# Patient Record
Sex: FEMALE | Race: WHITE | NOT HISPANIC OR LATINO | URBAN - METROPOLITAN AREA
[De-identification: names, ages, dates, MRNs, and addresses within clinical notes are randomized per-mention and may not be internally consistent; named-entity substitution may affect disease eponyms.]

---

## 2017-03-29 ENCOUNTER — APPOINTMENT (RX ONLY)
Dept: URBAN - METROPOLITAN AREA CLINIC 349 | Facility: CLINIC | Age: 32
Setting detail: DERMATOLOGY
End: 2017-03-29

## 2017-03-29 DIAGNOSIS — D22 MELANOCYTIC NEVI: ICD-10-CM

## 2017-03-29 DIAGNOSIS — L82.0 INFLAMED SEBORRHEIC KERATOSIS: ICD-10-CM

## 2017-03-29 DIAGNOSIS — Z71.89 OTHER SPECIFIED COUNSELING: ICD-10-CM

## 2017-03-29 PROBLEM — D22.5 MELANOCYTIC NEVI OF TRUNK: Status: ACTIVE | Noted: 2017-03-29

## 2017-03-29 PROBLEM — J30.1 ALLERGIC RHINITIS DUE TO POLLEN: Status: ACTIVE | Noted: 2017-03-29

## 2017-03-29 PROCEDURE — ? COUNSELING

## 2017-03-29 PROCEDURE — ? SHAVE REMOVAL

## 2017-03-29 PROCEDURE — 99213 OFFICE O/P EST LOW 20 MIN: CPT | Mod: 25

## 2017-03-29 PROCEDURE — 11301 SHAVE SKIN LESION 0.6-1.0 CM: CPT

## 2017-03-29 PROCEDURE — 88305 TISSUE EXAM BY PATHOLOGIST: CPT

## 2017-03-29 PROCEDURE — A4550 SURGICAL TRAYS: HCPCS

## 2017-03-29 PROCEDURE — ? PATHOLOGY BILLING

## 2017-03-29 ASSESSMENT — LOCATION DETAILED DESCRIPTION DERM
LOCATION DETAILED: LEFT SUPERIOR MEDIAL MIDBACK
LOCATION DETAILED: SUPERIOR LUMBAR SPINE
LOCATION DETAILED: INFERIOR THORACIC SPINE
LOCATION DETAILED: SUPERIOR THORACIC SPINE
LOCATION DETAILED: LEFT INFERIOR UPPER BACK
LOCATION DETAILED: RIGHT MEDIAL SUPERIOR CHEST

## 2017-03-29 ASSESSMENT — LOCATION SIMPLE DESCRIPTION DERM
LOCATION SIMPLE: LEFT UPPER BACK
LOCATION SIMPLE: CHEST
LOCATION SIMPLE: LEFT LOWER BACK
LOCATION SIMPLE: LOWER BACK
LOCATION SIMPLE: UPPER BACK

## 2017-03-29 ASSESSMENT — LOCATION ZONE DERM: LOCATION ZONE: TRUNK

## 2017-03-29 NOTE — PROCEDURE: SHAVE REMOVAL
Post-Care Instructions: I reviewed with the patient in detail post-care instructions. Patient is to keep the biopsy site dry overnight, and then apply vaseline twice daily until healed. Patient may apply hydrogen peroxide soaks to remove any crusting. After the procedure, the patient was observed for 5-10 minutes and was oriented to person, place and time and demied feeling dizzy, queasy, and stated that they did not feel that they were going to faint.
Size Of Lesion In Cm (Required): 0.6
Medical Necessity Clause: This procedure was medically necessary because the lesion that was treated was: growing
Billing Type: Third-Party Bill
Notification Instructions: Patient will be notified of biopsy results. However, patient instructed to call the office if not contacted within 2 weeks.
Size Of Margin In Cm (Margins Are Not Added To Billing Dimensions): -
Render Post-Care Instructions In Note?: yes
Path Notes (To The Dermatopathologist): Please check margins.
Bill 81353 For Specimen Handling/Conveyance To Laboratory?: no
Wound Care: Vaseline
Detail Level: Detailed
Biopsy Method: Dermablade
Anesthesia Type: 1% lidocaine with epinephrine
Accession #: Dr Hodge read
Consent was obtained from the patient. The risks and benefits to therapy were discussed in detail. Specifically, the risks of infection, scarring, bleeding, prolonged wound healing, incomplete removal, allergy to anesthesia, nerve injury and recurrence were addressed. Prior to the procedure, the treatment site was clearly identified and confirmed by the patient. All components of Universal Protocol/PAUSE Rule completed.
Anesthesia Volume In Cc: 0.5
Hemostasis: Drysol
Medical Necessity Information: It is in your best interest to select a reason for this procedure from the list below. All of these items fulfill various CMS LCD requirements except the new and changing color options.
X Size Of Lesion In Cm (Optional): 0

## 2018-08-17 ENCOUNTER — HOSPITAL ENCOUNTER (OUTPATIENT)
Dept: ULTRASOUND IMAGING | Age: 33
Discharge: HOME | End: 2018-08-17
Attending: OBSTETRICS & GYNECOLOGY
Payer: COMMERCIAL

## 2018-08-17 DIAGNOSIS — M79.89 RIGHT LEG SWELLING: ICD-10-CM

## 2018-08-17 PROCEDURE — 93971 EXTREMITY STUDY: CPT

## 2018-09-02 ENCOUNTER — HOSPITAL ENCOUNTER (INPATIENT)
Age: 33
LOS: 3 days | Discharge: HOME | End: 2018-09-05
Attending: OBSTETRICS & GYNECOLOGY | Admitting: OBSTETRICS & GYNECOLOGY
Payer: COMMERCIAL

## 2018-09-02 PROBLEM — Z37.9 NORMAL LABOR: Status: ACTIVE | Noted: 2018-09-02

## 2018-09-02 LAB
ABO + RH BLD: NORMAL
BLOOD GROUP ANTIBODIES SERPL: NORMAL
ERYTHROCYTE [DISTWIDTH] IN BLOOD BY AUTOMATED COUNT: 13.2 %
HCT VFR BLD AUTO: 37.8 % (ref 35.8–46.3)
HGB BLD-MCNC: 12.6 G/DL (ref 11.7–15.4)
MCH RBC QN AUTO: 31 PG (ref 26.1–32.9)
MCHC RBC AUTO-ENTMCNC: 33.3 G/DL (ref 31.4–35)
MCV RBC AUTO: 92.9 FL (ref 79.6–97.8)
NRBC # BLD: 0 K/UL (ref 0–0.2)
PLATELET # BLD AUTO: 183 K/UL (ref 150–450)
PMV BLD AUTO: 12.1 FL (ref 9.4–12.3)
RBC # BLD AUTO: 4.07 M/UL (ref 4.05–5.2)
SPECIMEN EXP DATE BLD: NORMAL
WBC # BLD AUTO: 9.9 K/UL (ref 4.3–11.1)

## 2018-09-02 PROCEDURE — 65270000029 HC RM PRIVATE

## 2018-09-02 PROCEDURE — 36415 COLL VENOUS BLD VENIPUNCTURE: CPT

## 2018-09-02 PROCEDURE — 74011250637 HC RX REV CODE- 250/637: Performed by: OBSTETRICS & GYNECOLOGY

## 2018-09-02 PROCEDURE — 85027 COMPLETE CBC AUTOMATED: CPT

## 2018-09-02 PROCEDURE — 86901 BLOOD TYPING SEROLOGIC RH(D): CPT

## 2018-09-02 RX ORDER — SODIUM CHLORIDE 0.9 % (FLUSH) 0.9 %
5-10 SYRINGE (ML) INJECTION EVERY 8 HOURS
Status: DISCONTINUED | OUTPATIENT
Start: 2018-09-02 | End: 2018-09-04

## 2018-09-02 RX ORDER — LIDOCAINE HYDROCHLORIDE 10 MG/ML
1 INJECTION INFILTRATION; PERINEURAL
Status: DISPENSED | OUTPATIENT
Start: 2018-09-02 | End: 2018-09-03

## 2018-09-02 RX ORDER — DEXTROSE, SODIUM CHLORIDE, SODIUM LACTATE, POTASSIUM CHLORIDE, AND CALCIUM CHLORIDE 5; .6; .31; .03; .02 G/100ML; G/100ML; G/100ML; G/100ML; G/100ML
125 INJECTION, SOLUTION INTRAVENOUS CONTINUOUS
Status: DISCONTINUED | OUTPATIENT
Start: 2018-09-02 | End: 2018-09-05 | Stop reason: HOSPADM

## 2018-09-02 RX ORDER — OXYTOCIN/RINGER'S LACTATE 15/250 ML
250 PLASTIC BAG, INJECTION (ML) INTRAVENOUS ONCE
Status: DISPENSED | OUTPATIENT
Start: 2018-09-02 | End: 2018-09-03

## 2018-09-02 RX ORDER — BUTORPHANOL TARTRATE 1 MG/ML
1 INJECTION INTRAMUSCULAR; INTRAVENOUS
Status: DISCONTINUED | OUTPATIENT
Start: 2018-09-02 | End: 2018-09-03 | Stop reason: HOSPADM

## 2018-09-02 RX ORDER — SODIUM CHLORIDE 0.9 % (FLUSH) 0.9 %
5-10 SYRINGE (ML) INJECTION AS NEEDED
Status: DISCONTINUED | OUTPATIENT
Start: 2018-09-02 | End: 2018-09-04

## 2018-09-02 RX ORDER — LANOLIN ALCOHOL/MO/W.PET/CERES
325 CREAM (GRAM) TOPICAL
COMMUNITY
End: 2021-09-02 | Stop reason: ALTCHOICE

## 2018-09-02 RX ORDER — MISOPROSTOL 100 UG/1
25 TABLET ORAL EVERY 4 HOURS
Status: DISCONTINUED | OUTPATIENT
Start: 2018-09-02 | End: 2018-09-03

## 2018-09-02 RX ORDER — LIDOCAINE HYDROCHLORIDE 20 MG/ML
JELLY TOPICAL
Status: DISPENSED | OUTPATIENT
Start: 2018-09-02 | End: 2018-09-03

## 2018-09-02 RX ORDER — MINERAL OIL
120 OIL (ML) ORAL
Status: COMPLETED | OUTPATIENT
Start: 2018-09-02 | End: 2018-09-03

## 2018-09-02 RX ADMIN — MISOPROSTOL 25 MCG: 100 TABLET ORAL at 23:46

## 2018-09-02 RX ADMIN — MISOPROSTOL 25 MCG: 100 TABLET ORAL at 19:26

## 2018-09-02 NOTE — IP AVS SNAPSHOT
[facility] 
 
 
 [id number]  [address] 
[phone] Patient: [address] MRN: [id number] :1985 About your hospitalization You were admitted on:  2018 You last received care in the:  [address] [facility] You were discharged on:  2018 Why you were hospitalized Your primary diagnosis was:  Gestational Diabetes Your diagnoses also included:  Normal Labor, Htn (Hypertension) Follow-up Information Follow up With Details Comments Contact Info Dnana Kirkpatrick MD   [address] [place] [address] [address] [facility] [address] 
[phone] Enmanuel Good MD In 6 weeks Call and schedule an appointment for a 6 week follow up with [facility] [address] [facility] [address] 
[phone] Discharge Orders None A check eladio indicates which time of day the medication should be taken. My Medications START taking these medications Instructions Each Dose to Equal  
 Morning Noon Evening Bedtime  
 docusate sodium 100 mg capsule Commonly known as:  Merle Dilling Your last dose was: Your next dose is: Take 1 Cap by mouth two (2) times daily as needed for Constipation for up to 90 days. 100 mg  
    
   
   
   
  
 ibuprofen 600 mg tablet Commonly known as:  MOTRIN Your last dose was: Your next dose is: Take 1 Tab by mouth every six (6) hours as needed. 600 mg  
    
   
   
   
  
 oxyCODONE-acetaminophen 5-325 mg per tablet Commonly known as:  PERCOCET Your last dose was: Your next dose is: Take 1 Tab by mouth every six (6) hours as needed. Max Daily Amount: 4 Tabs. 1 Tab CONTINUE taking these medications Instructions Each Dose to Equal  
 Morning Noon Evening Bedtime Iron 325 mg (65 mg iron) tablet Generic drug:  ferrous sulfate Your last dose was: Your next dose is: Take 325 mg by mouth Daily (before breakfast). 325 mg  
    
   
   
   
  
 RIGHT STEP PRENATAL VITAMINS 27 mg iron- 0.8 mg Tab tablet Generic drug:  prenatal vit-iron fumarate-fa Your last dose was: Your next dose is: Take 1 Tab by mouth daily. 1 Tab STOP taking these medications   
 norethindrone-e.estradiol-iron 1 mg-20 mcg(24) /75 mg (4) Chew Where to Get Your Medications Information on where to get these meds will be given to you by the nurse or doctor. ! Ask your nurse or doctor about these medications  
  docusate sodium 100 mg capsule  
 ibuprofen 600 mg tablet  
 oxyCODONE-acetaminophen 5-325 mg per tablet Opioid Education Prescription Opioids: What You Need to Know: 
 
Prescription opioids can be used to help relieve moderate-to-severe pain and are often prescribed following a surgery or injury, or for certain health conditions. These medications can be an important part of treatment but also come with serious risks. Opioids are strong pain medicines. Examples include hydrocodone, oxycodone, fentanyl, and morphine. Heroin is an example of an illegal opioid. It is important to work with your health care provider to make sure you are getting the safest, most effective care. WHAT ARE THE RISKS AND SIDE EFFECTS OF OPIOID USE? Prescription opioids carry serious risks of addiction and overdose, especially with prolonged use. An opioid overdose, often marked by slow breathing, can cause sudden death. The use of prescription opioids can have a number of side effects as well, even when taken as directed. · Tolerance-meaning you might need to take more of a medication for the same pain relief · Physical dependence-meaning you have symptoms of withdrawal when the medication is stopped. Withdrawal symptoms can include nausea, sweating, chills, diarrhea, stomach cramps, and muscle aches. Withdrawal can last up to several weeks, depending on which drug you took and how long you took it. · Increased sensitivity to pain · Constipation · Nausea, vomiting, and dry mouth · Sleepiness and dizziness · Confusion · Depression · Low levels of testosterone that can result in lower sex drive, energy, and strength · Itching and sweating RISKS ARE GREATER WITH:      
· History of drug misuse, substance use disorder, or overdose · Mental health conditions (such as depression or anxiety) · Sleep apnea · Older age (72 years or older) · Pregnancy Avoid alcohol while taking prescription opioids. Also, unless specifically advised by your health care provider, medications to avoid include: · Benzodiazepines (such as Xanax or Valium) · Muscle relaxants (such as Soma or Flexeril) · Hypnotics (such as Ambien or Lunesta) · Other prescription opioids KNOW YOUR OPTIONS Talk to your health care provider about ways to manage your pain that don't involve prescription opioids. Some of these options may actually work better and have fewer risks and side effects. Options may include: 
· Pain relievers such as acetaminophen, ibuprofen, and naproxen · Some medications that are also used for depression or seizures · Physical therapy and exercise · Counseling to help patients learn how to cope better with triggers of pain and stress. · Application of heat or cold compress · Massage therapy · Relaxation techniques Be Informed Make sure you know the name of your medication, how much and how often to take it, and its potential risks & side effects. IF YOU ARE PRESCRIBED OPIOIDS FOR PAIN: 
· Never take opioids in greater amounts or more often than prescribed. Remember the goal is not to be pain-free but to manage your pain at a tolerable level. · Follow up with your primary care provider to: · Work together to create a plan on how to manage your pain. · Talk about ways to help manage your pain that don't involve prescription opioids. · Talk about any and all concerns and side effects. · Help prevent misuse and abuse. · Never sell or share prescription opioids · Help prevent misuse and abuse. · Store prescription opioids in a secure place and out of reach of others (this may include visitors, children, friends, and family). · Safely dispose of unused/unwanted prescription opioids: Find your community drug take-back program or your pharmacy mail-back program, or flush them down the toilet, following guidance from the Food and Drug Administration (www.fda.gov/Drugs/ResourcesForYou). · Visit www.cdc.gov/drugoverdose to learn about the risks of opioid abuse and overdose. · If you believe you may be struggling with addiction, tell your health care provider and ask for guidance or call [employer] at 0-887-655-HUKR. Discharge Instructions Vaginal Childbirth: Care Instructions Your Care Instructions Your body will slowly heal in the next few weeks. It is easy to get too tired and overwhelmed during the first weeks after your baby is born. Changes in your hormones can shift your mood without warning. You may find it hard to meet the extra demands on your energy and time. Take it easy on yourself. Follow-up care is a key part of your treatment and safety. Be sure to make and go to all appointments, and call your doctor if you are having problems. It's also a good idea to know your test results and keep a list of the medicines you take. How can you care for yourself at home? · Vaginal bleeding and cramps ¨ After delivery, you will have a bloody discharge from the vagina. This will turn pink within a week and then white or yellow after about 10 days. It may last for 2 to 4 weeks or longer, until the uterus has healed. Use pads instead of tampons until you stop bleeding. ¨ Do not worry if you pass some blood clots, as long as they are smaller than a golf ball. If you have a tear or stitches in your vaginal area, change the pad at least every 4 hours to prevent soreness and infection. ¨ You may have cramps for the first few days after childbirth. These are normal and occur as the uterus shrinks to normal size. Take an over-the-counter pain medicine, such as acetaminophen (Tylenol), ibuprofen (Advil, Motrin), or naproxen (Aleve), for cramps. Read and follow all instructions on the label. Do not take aspirin, because it can cause more bleeding. ¨ Do not take two or more pain medicines at the same time unless the doctor told you to. Many pain medicines have acetaminophen, which is Tylenol. Too much acetaminophen (Tylenol) can be harmful. · Stitches ¨ If you have stitches, they will dissolve on their own and do not need to be removed. Follow your doctor's instructions for cleaning the stitched area. ¨ Put ice or a cold pack on your painful area for 10 to 20 minutes at a time, several times a day, for the first few days. Put a thin cloth between the ice and your skin. ¨ Sit in a few inches of warm water (sitz bath) 3 times a day and after bowel movements. The warm water helps with pain and itching. If you do not have a tub, a warm shower might help. · Breast fullness ¨ Your breasts may overfill (engorge) in the first few days after delivery. To help milk flow and to relieve pain, warm your breasts in the shower or by using warm, moist towels before nursing. ¨ If you are not nursing, do not put warmth on your breasts or touch your breasts. Wear a tight bra or sports bra and use ice until the fullness goes away. This usually takes 2 to 3 days.  
¨ Put ice or a cold pack on your breast after nursing to reduce swelling and pain. Put a thin cloth between the ice and your skin. · Activity ¨ Eat a balanced diet. Do not try to lose weight by cutting calories. Keep taking your prenatal vitamins, or take a multivitamin. ¨ Get as much rest as you can. Try to take naps when your baby sleeps during the day. ¨ Get some exercise every day. But do not do any heavy exercise until your doctor says it is okay. ¨ Wait until you are healed (about 4 to 6 weeks) before you have sexual intercourse. Your doctor will tell you when it is okay to have sex. ¨ Talk to your doctor about birth control. You can get pregnant even before your period returns. Also, you can get pregnant while you are breastfeeding. · Mental health ¨ It is normal to have some sadness, anxiety, sleeplessness, and mood swings after you go home. If you feel upset or hopeless for more than a few days or are having trouble doing the things you need to do, talk to your doctor. · Constipation and hemorrhoids ¨ Drink plenty of fluids, enough so that your urine is light yellow or clear like water. If you have kidney, heart, or liver disease and have to limit fluids, talk with your doctor before you increase the amount of fluids you drink. ¨ Eat plenty of fiber each day. Have a bran muffin or bran cereal for breakfast, and try eating a piece of fruit for a mid-afternoon snack. ¨ For painful, itchy hemorrhoids, put ice or a cold pack on the area several times a day for 10 minutes at a time. Follow this by putting a warm compress on the area for another 10 to 20 minutes or by sitting in a shallow, warm bath. When should you call for help? Call 911 anytime you think you may need emergency care. For example, call if: 
  · You passed out (lost consciousness).  
 Call your doctor now or seek immediate medical care if: 
  · You have severe vaginal bleeding.  
  · You are dizzy or lightheaded, or you feel like you may faint.  
  · You have a fever.   · You have new or more pain in your belly or pelvis.  
 Watch closely for changes in your health, and be sure to contact your doctor if: 
  · Your vaginal bleeding seems to be getting heavier.  
  · You have new or worse vaginal discharge.  
  · You feel sad, anxious, or hopeless for more than a few days.  
  · You do not get better as expected. Where can you learn more? Go to http://nikolas-izabela.info/. Enter L012 in the search box to learn more about \"Vaginal Childbirth: Care Instructions. \" Current as of: November 21, 2017 Content Version: 11.7 © 0879-3465 [employer]. Care instructions adapted under license by [employer] (which disclaims liability or warranty for this information). If you have questions about a medical condition or this instruction, always ask your healthcare professional. [address] any warranty or liability for your use of this information. [facility] Announcement We are excited to announce that we are making your provider's discharge notes available to you in [facility]. You will see these notes when they are completed and signed by the physician that discharged you from your recent hospital stay. If you have any questions or concerns about any information you see in [facility], please call the Health Information Department where you were seen or reach out to your Primary Care Provider for more information about your plan of care. Introducing [facility]! [employer] introduces [facility] patient portal. Now you can access parts of your medical record, email your doctor's office, and request medication refills online. 1. In your internet browser, go to [email or web address] 2. Click on the First Time User? Click Here link in the Sign In box. You will see the New Member Sign Up page. 3. Enter your [facility] Access Code exactly as it appears below.  You will not need to use this code after youve completed the sign-up process. If you do not sign up before the expiration date, you must request a new code. · [employer] Access Code: [email or web address] Expires: 10/24/2018  3:19 PM 
 
4. Enter the last four digits of your Social Security Number (xxxx) and Date of Birth (mm/dd/yyyy) as indicated and click Submit. You will be taken to the next sign-up page. 5. Create a [employer] ID. This will be your [employer] login ID and cannot be changed, so think of one that is secure and easy to remember. 6. Create a [employer] password. You can change your password at any time. 7. Enter your Password Reset Question and Answer. This can be used at a later time if you forget your password. 8. Enter your e-mail address. You will receive e-mail notification when new information is available in [address] 9. Click Sign Up. You can now view and download portions of your medical record. 10. Click the Download Summary menu link to download a portable copy of your medical information. If you have questions, please visit the Frequently Asked Questions section of the [employer] website. Remember, [employer] is NOT to be used for urgent needs. For medical emergencies, dial 911. Now available from your iPhone and Android! Introducing Tae Mckeon As a [facility] patient, I wanted to make you aware of our electronic visit tool called Tae Beedwardopinky. [facility] 24/7 allows you to connect within minutes with a medical provider 24 hours a day, seven days a week via a mobile device or tablet or logging into a secure website from your computer. You can access Tae Mckeon from anywhere in the [place].  
 
A virtual visit might be right for you when you have a simple condition and feel like you just dont want to get out of bed, or cant get away from work for an appointment, when your regular [facility] provider is not available (evenings, weekends or holidays), or when youre out of town and need minor care. Electronic visits cost only $49 and if the [facility] 24/7 provider determines a prescription is needed to treat your condition, one can be electronically transmitted to a nearby pharmacy*. Please take a moment to enroll today if you have not already done so. The enrollment process is free and takes just a few minutes. To enroll, please download the [employer] donta to your tablet or phone, or visit [email or web address] to enroll on your computer. And, as an [address] [place] [address] patient with a [employer] account, the results of your visits will be scanned into your electronic medical record and your primary care provider will be able to view the scanned results. We urge you to continue to see your regular [facility] provider for your ongoing medical care. And while your primary care provider may not be the one available when you seek a Equivalent DATAedwardofin virtual visit, the peace of mind you get from getting a real diagnosis real time can be priceless. For more information on [employer], view our Frequently Asked Questions (FAQs) at [email or web address]. Sincerely, 
 
Jonathan Camilo MD 
Chief Medical Officer [address] *:  certain medications cannot be prescribed via Cell[employer]an Providers Seen During Your Hospitalization Provider Specialty Primary office phone Primo Nance MD Obstetrics & Gynecology [phone] Your Primary Care Physician (PCP) Primary Care Physician Office Phone Office Fax Pascale Barnett You are allergic to the following No active allergies Recent Documentation Height Weight Breastfeeding? BMI OB Status Smoking Status 1.6 m 76.2 kg Yes 29.76 kg/m2 Recent pregnancy Never Smoker Emergency Contacts Name Discharge Info Relation Home Work Mobile Julio Horta DISCHARGE CAREGIVER [3] Spouse [3]   [phone] Patient Belongings The following personal items are in your possession at time of discharge: 
  Dental Appliances: None  Visual Aid: Glasses, With patient      Home Medications: None   Jewelry: Ring, Earrings  Clothing: At bedside    Other Valuables: At bedside Please provide this summary of care documentation to your next provider. Signatures-by signing, you are acknowledging that this After Visit Summary has been reviewed with you and you have received a copy. Patient Signature:  ____________________________________________________________ Date:  ____________________________________________________________  
  
[facility] Provider Signature:  ____________________________________________________________ Date:  ____________________________________________________________

## 2018-09-02 NOTE — PROGRESS NOTES
09/02/18 1918 Cervical Exam  
Dilation (cm) 1 Eff 50 % Station -1 Position Posterior Cervical Consistency Moderate Vaginal exam done by? Nadeem Curtis RN First dose of cytotec 25mg administered.

## 2018-09-03 ENCOUNTER — ANESTHESIA EVENT (OUTPATIENT)
Dept: LABOR AND DELIVERY | Age: 33
End: 2018-09-03
Payer: COMMERCIAL

## 2018-09-03 ENCOUNTER — ANESTHESIA (OUTPATIENT)
Dept: LABOR AND DELIVERY | Age: 33
End: 2018-09-03
Payer: COMMERCIAL

## 2018-09-03 PROBLEM — O24.419 GESTATIONAL DIABETES: Status: ACTIVE | Noted: 2018-09-03

## 2018-09-03 PROBLEM — I10 HTN (HYPERTENSION): Status: ACTIVE | Noted: 2018-09-03

## 2018-09-03 LAB
BASE DEFICIT BLDCOV-SCNC: 0.3 MMOL/L (ref 1.9–7.7)
BASE EXCESS BLDCOA CALC-SCNC: 0 MMOL/L (ref 0–3)
BDY SITE: ABNORMAL
BDY SITE: ABNORMAL
GLUCOSE BLD STRIP.AUTO-MCNC: 82 MG/DL (ref 65–100)
HCO3 BLDCOA-SCNC: 29 MMOL/L (ref 22–26)
HCO3 BLDV-SCNC: 25 MMOL/L
PCO2 BLDCOA: 64 MMHG (ref 33–49)
PCO2 BLDCOV: 43 MMHG (ref 14.1–43.3)
PH BLDCOA: 7.27 [PH] (ref 7.21–7.31)
PH BLDCOV: 7.38 [PH] (ref 7.2–7.44)
PO2 BLDCOA: 21 MMHG (ref 9–19)
PO2 BLDV: 33 MMHG (ref 30.4–57.2)
SERVICE CMNT-IMP: ABNORMAL
SERVICE CMNT-IMP: ABNORMAL

## 2018-09-03 PROCEDURE — 76060000078 HC EPIDURAL ANESTHESIA

## 2018-09-03 PROCEDURE — 77030003028 HC SUT VCRL J&J -A

## 2018-09-03 PROCEDURE — 75410000002 HC LABOR FEE PER 1 HR

## 2018-09-03 PROCEDURE — 75410000000 HC DELIVERY VAGINAL/SINGLE

## 2018-09-03 PROCEDURE — 3E033VJ INTRODUCTION OF OTHER HORMONE INTO PERIPHERAL VEIN, PERCUTANEOUS APPROACH: ICD-10-PCS | Performed by: OBSTETRICS & GYNECOLOGY

## 2018-09-03 PROCEDURE — 74011000250 HC RX REV CODE- 250

## 2018-09-03 PROCEDURE — 77030014125 HC TY EPDRL BBMI -B: Performed by: ANESTHESIOLOGY

## 2018-09-03 PROCEDURE — 74011250636 HC RX REV CODE- 250/636

## 2018-09-03 PROCEDURE — 77030011943

## 2018-09-03 PROCEDURE — 74011258636 HC RX REV CODE- 258/636: Performed by: OBSTETRICS & GYNECOLOGY

## 2018-09-03 PROCEDURE — 0KQM0ZZ REPAIR PERINEUM MUSCLE, OPEN APPROACH: ICD-10-PCS | Performed by: OBSTETRICS & GYNECOLOGY

## 2018-09-03 PROCEDURE — 77030009363 HC CUP VAC EXTRCT CNCI -B

## 2018-09-03 PROCEDURE — 75410000003 HC RECOV DEL/VAG/CSECN EA 0.5 HR

## 2018-09-03 PROCEDURE — A4300 CATH IMPL VASC ACCESS PORTAL: HCPCS | Performed by: ANESTHESIOLOGY

## 2018-09-03 PROCEDURE — 77010026065 HC OXYGEN MINIMUM MEDICAL AIR

## 2018-09-03 PROCEDURE — 74011250637 HC RX REV CODE- 250/637: Performed by: OBSTETRICS & GYNECOLOGY

## 2018-09-03 PROCEDURE — 65270000029 HC RM PRIVATE

## 2018-09-03 PROCEDURE — 77030011945 HC CATH URIN INT ST MENT -A

## 2018-09-03 PROCEDURE — 77030018846 HC SOL IRR STRL H20 ICUM -A

## 2018-09-03 PROCEDURE — 82962 GLUCOSE BLOOD TEST: CPT

## 2018-09-03 PROCEDURE — 82803 BLOOD GASES ANY COMBINATION: CPT

## 2018-09-03 PROCEDURE — 74011250636 HC RX REV CODE- 250/636: Performed by: OBSTETRICS & GYNECOLOGY

## 2018-09-03 RX ORDER — DIPHENHYDRAMINE HCL 25 MG
25 CAPSULE ORAL
Status: DISCONTINUED | OUTPATIENT
Start: 2018-09-03 | End: 2018-09-05 | Stop reason: HOSPADM

## 2018-09-03 RX ORDER — SIMETHICONE 80 MG
80 TABLET,CHEWABLE ORAL
Status: DISCONTINUED | OUTPATIENT
Start: 2018-09-03 | End: 2018-09-05 | Stop reason: HOSPADM

## 2018-09-03 RX ORDER — OXYCODONE AND ACETAMINOPHEN 5; 325 MG/1; MG/1
1 TABLET ORAL
Status: DISCONTINUED | OUTPATIENT
Start: 2018-09-03 | End: 2018-09-05 | Stop reason: HOSPADM

## 2018-09-03 RX ORDER — LIDOCAINE HYDROCHLORIDE 10 MG/ML
INJECTION, SOLUTION EPIDURAL; INFILTRATION; INTRACAUDAL; PERINEURAL
Status: COMPLETED
Start: 2018-09-03 | End: 2018-09-03

## 2018-09-03 RX ORDER — OXYCODONE AND ACETAMINOPHEN 5; 325 MG/1; MG/1
2 TABLET ORAL
Status: DISCONTINUED | OUTPATIENT
Start: 2018-09-03 | End: 2018-09-05 | Stop reason: HOSPADM

## 2018-09-03 RX ORDER — ONDANSETRON 4 MG/1
4 TABLET, ORALLY DISINTEGRATING ORAL
Status: ACTIVE | OUTPATIENT
Start: 2018-09-03 | End: 2018-09-04

## 2018-09-03 RX ORDER — ONDANSETRON 2 MG/ML
4 INJECTION INTRAMUSCULAR; INTRAVENOUS ONCE
Status: ACTIVE | OUTPATIENT
Start: 2018-09-03 | End: 2018-09-03

## 2018-09-03 RX ORDER — DOCUSATE SODIUM 100 MG/1
100 CAPSULE, LIQUID FILLED ORAL 2 TIMES DAILY
Status: DISCONTINUED | OUTPATIENT
Start: 2018-09-04 | End: 2018-09-05 | Stop reason: HOSPADM

## 2018-09-03 RX ORDER — BUTORPHANOL TARTRATE 1 MG/ML
1 INJECTION INTRAMUSCULAR; INTRAVENOUS
Status: ACTIVE | OUTPATIENT
Start: 2018-09-03 | End: 2018-09-04

## 2018-09-03 RX ORDER — IBUPROFEN 600 MG/1
600 TABLET ORAL
Status: DISCONTINUED | OUTPATIENT
Start: 2018-09-03 | End: 2018-09-05 | Stop reason: HOSPADM

## 2018-09-03 RX ORDER — ROPIVACAINE HYDROCHLORIDE 2 MG/ML
INJECTION, SOLUTION EPIDURAL; INFILTRATION; PERINEURAL
Status: DISCONTINUED | OUTPATIENT
Start: 2018-09-03 | End: 2018-09-03 | Stop reason: HOSPADM

## 2018-09-03 RX ORDER — BUPIVACAINE HYDROCHLORIDE 2.5 MG/ML
INJECTION, SOLUTION EPIDURAL; INFILTRATION; INTRACAUDAL AS NEEDED
Status: DISCONTINUED | OUTPATIENT
Start: 2018-09-03 | End: 2018-09-03 | Stop reason: HOSPADM

## 2018-09-03 RX ORDER — OXYTOCIN/RINGER'S LACTATE 30/500 ML
1-25 PLASTIC BAG, INJECTION (ML) INTRAVENOUS
Status: DISCONTINUED | OUTPATIENT
Start: 2018-09-03 | End: 2018-09-05 | Stop reason: HOSPADM

## 2018-09-03 RX ADMIN — BUTORPHANOL TARTRATE 1 MG: 1 INJECTION, SOLUTION INTRAMUSCULAR; INTRAVENOUS at 10:13

## 2018-09-03 RX ADMIN — SODIUM CHLORIDE, SODIUM LACTATE, POTASSIUM CHLORIDE, AND CALCIUM CHLORIDE 500 ML: 600; 310; 30; 20 INJECTION, SOLUTION INTRAVENOUS at 15:30

## 2018-09-03 RX ADMIN — OXYTOCIN 2 MILLI-UNITS/MIN: 10 INJECTION, SOLUTION INTRAMUSCULAR; INTRAVENOUS at 13:15

## 2018-09-03 RX ADMIN — MINERAL OIL 240 ML: 471.95 OIL ORAL at 16:05

## 2018-09-03 RX ADMIN — ROPIVACAINE HYDROCHLORIDE 10 ML/HR: 2 INJECTION, SOLUTION EPIDURAL; INFILTRATION; PERINEURAL at 12:49

## 2018-09-03 RX ADMIN — MISOPROSTOL 25 MCG: 100 TABLET ORAL at 03:57

## 2018-09-03 RX ADMIN — BUPIVACAINE HYDROCHLORIDE 10 ML: 2.5 INJECTION, SOLUTION EPIDURAL; INFILTRATION; INTRACAUDAL at 12:42

## 2018-09-03 RX ADMIN — WITCH HAZEL 1 PAD: 500 SOLUTION RECTAL; TOPICAL at 22:40

## 2018-09-03 RX ADMIN — Medication 10 ML: at 07:04

## 2018-09-03 RX ADMIN — Medication 1 SPRAY: at 22:40

## 2018-09-03 RX ADMIN — SODIUM CHLORIDE, SODIUM LACTATE, POTASSIUM CHLORIDE, CALCIUM CHLORIDE AND DEXTROSE MONOHYDRATE 125 ML/HR: 5; 600; 310; 30; 20 INJECTION, SOLUTION INTRAVENOUS at 09:22

## 2018-09-03 RX ADMIN — IBUPROFEN 600 MG: 600 TABLET, FILM COATED ORAL at 22:38

## 2018-09-03 RX ADMIN — Medication 20 ML: at 07:38

## 2018-09-03 RX ADMIN — LIDOCAINE HYDROCHLORIDE 5 ML: 10 INJECTION, SOLUTION EPIDURAL; INFILTRATION; INTRACAUDAL; PERINEURAL at 18:17

## 2018-09-03 RX ADMIN — SODIUM CHLORIDE, SODIUM LACTATE, POTASSIUM CHLORIDE, AND CALCIUM CHLORIDE 1000 ML: 600; 310; 30; 20 INJECTION, SOLUTION INTRAVENOUS at 11:43

## 2018-09-03 RX ADMIN — BUTORPHANOL TARTRATE 1 MG: 1 INJECTION, SOLUTION INTRAMUSCULAR; INTRAVENOUS at 07:37

## 2018-09-03 RX ADMIN — MISOPROSTOL 25 MCG: 100 TABLET ORAL at 08:23

## 2018-09-03 RX ADMIN — SODIUM CHLORIDE, SODIUM LACTATE, POTASSIUM CHLORIDE, CALCIUM CHLORIDE AND DEXTROSE MONOHYDRATE 125 ML/HR: 5; 600; 310; 30; 20 INJECTION, SOLUTION INTRAVENOUS at 17:31

## 2018-09-03 NOTE — ANESTHESIA POSTPROCEDURE EVALUATION
Post-Anesthesia Evaluation and Assessment Patient: Nicholas Sullivan MRN: [id number]  SSN: [id number] YOB: 1985  Age: 28 y.o. Sex: female Cardiovascular Function/Vital Signs Visit Vitals  /73  Pulse 81  Temp 36.8 °C (98.2 °F)  Resp 18  Ht 5' 3\" (1.6 m)  Wt 76.2 kg (168 lb)  SpO2 97%  Breastfeeding Yes  BMI 29.76 kg/m2 Patient is status post epidural anesthesia for labor and delivery Nausea/Vomiting: None Postoperative hydration reviewed and adequate. Pain: 
Pain Scale 1: Numeric (0 - 10) (09/03/18 1831) Pain Intensity 1: 0 (09/03/18 1831) Managed Neurological Status: At baseline Mental Status and Level of Consciousness: Arousable Pulmonary Status:  
O2 Device: Room air (09/03/18 1446) Adequate oxygenation and airway patent Complications related to anesthesia: None The patient was satisfied with her labor epidural and denies any complications. Her lower extremities have returned to baseline neurologically. Post-anesthesia assessment completed. No concerns Signed By: Clarence Frank MD   
 September 3, 2018

## 2018-09-03 NOTE — PROGRESS NOTES
Patient stated \"Small amount of fluid ran down left leg. \" Nitrazine test performed. Result negative.

## 2018-09-03 NOTE — ANESTHESIA PROCEDURE NOTES
Epidural Block Start time: 9/3/2018 12:36 PM 
End time: 9/3/2018 12:50 PM 
Performed by: Meg Rangel Authorized by: Meg Rangel Pre-Procedure Indication: labor epidural   
Preanesthetic Checklist: patient identified, risks and benefits discussed, anesthesia consent, site marked, patient being monitored, timeout performed and anesthesia consent Timeout Time: 12:37 Epidural:  
Patient position:  Seated Prep region:  Lumbar Prep: Chlorhexidine Location:  L3-4 Needle and Epidural Catheter:  
Needle Type:  Tuohy Needle Gauge:  17 G Injection Technique:  Loss of resistance using air Attempts:  1 Catheter Size:  19 G Catheter at Skin Depth (cm):  11 Depth in Epidural Space (cm):  5 Events: no blood with aspiration, no cerebrospinal fluid with aspiration, no paresthesia and negative aspiration test   
Test Dose:  Negative and lidocaine 1.5% w/ epi (Negative IV and SAB test dose at 1244) Assessment:  
Catheter Secured:  Tegaderm and tape Insertion:  Uncomplicated Patient tolerance:  Patient tolerated the procedure well with no immediate complications

## 2018-09-03 NOTE — PROGRESS NOTES
SBAR report received from Raina Kat RN; pt care assumed Patient Active Problem List  
Diagnosis Code  Routine medical exam Z00.00  Normal labor O80, Z37.9 Patient Active Problem List  
 Diagnosis Date Noted  Normal labor 09/02/2018  Routine medical exam 12/22/2015 No Known Allergies Lab Results Component Value Date/Time ABO/Rh(D) A POSITIVE 09/02/2018 06:26 PM  
 Antibody screen NEG 09/02/2018 06:26 PM  
 
CBC Results Lab Results Component Value Date/Time WBC 9.9 09/02/2018 06:26 PM  
 HGB 12.6 09/02/2018 06:26 PM  
 HCT 37.8 09/02/2018 06:26 PM  
 PLATELET 856 85/39/4079 06:26 PM  
 
 
Complete Metabolic Panel Results Lab Results Component Value Date/Time  Sodium 139 12/22/2015 11:24 AM  
 Potassium 4.4 12/22/2015 11:24 AM  
 Chloride 100 12/22/2015 11:24 AM  
 CO2 24 12/22/2015 11:24 AM  
 Glucose 87 12/22/2015 11:24 AM  
 BUN 15 12/22/2015 11:24 AM  
 Creatinine 0.69 12/22/2015 11:24 AM  
 GFR est  12/22/2015 11:24 AM  
 GFR est non- 12/22/2015 11:24 AM  
 Calcium 9.4 12/22/2015 11:24 AM

## 2018-09-03 NOTE — H&P
History & Physical 
 
Name: Akila Antonio MRN: [id number]  SSN: [id number] YOB: 1985  Age: 28 y.o. Sex: female Subjective:  
 
Estimated Date of Delivery: 18 OB History  Para Term  AB Living 1 0 0 0 0 0 SAB TAB Ectopic Molar Multiple Live Births  
0 0 0  0 # Outcome Date GA Lbr Ajit/2nd Weight Sex Delivery Anes PTL Lv  
1 Current Ms. [place] Ed is admitted with pregnancy at 39w1d for induction of labor due to gestational diabetes and chronic HTN. Prenatal course was complicated by chronic hypertension and diabetes - gestational.  Please see prenatal records for details. Past Medical History:  
Diagnosis Date  Gestational diabetes  H/O seasonal allergies  HTN (hypertension)   
 controlled without medication  Proteinuria   
 work up with nephrology was negative  Pseudoangiomatous stromal hyperplasia of breast   
 followed by Poli Crow ([facility] [address]   
 as a child, 1 isolated Past Surgical History:  
Procedure Laterality Date  HX BREAST LUMPECTOMY  HX WISDOM TEETH EXTRACTION Social History Occupational History  physical therapist   
 
Social History Main Topics  Smoking status: Never Smoker  Smokeless tobacco: Never Used  Alcohol use No  
 Drug use: No  
 Sexual activity: Yes  
  Partners: Male Family History Problem Relation Age of Onset  Hypertension Father  Cancer Father   
  prostate  Hypertension Mother  Thyroid Disease Other  Heart Disease Paternal Grandfather  Stroke Paternal Grandfather  Heart Disease Maternal Grandfather  Stroke Paternal Grandmother  Breast Cancer Paternal Aunt   
  4 aunts No Known Allergies Prior to Admission medications Medication Sig Start Date End Date Taking?  Authorizing Provider  
prenatal vit-iron fumarate-fa (RIGHT STEP PRENATAL VITAMINS) 27 mg iron- 0.8 mg tab tablet Take 1 Tab by mouth daily. Yes Historical Provider  
ferrous sulfate (IRON) 325 mg (65 mg iron) tablet Take 325 mg by mouth Daily (before breakfast). Yes Historical Provider  
norethindrone-e.estradiol-iron 1 mg-20 mcg(24) /75 mg (4) chew Take 1 Tab by mouth daily. 12/22/15   Ramiro Traore MD  
  
 
Review of Systems: A comprehensive review of systems was negative except for that written in the History of Present Illness. Objective:  
 
Vitals: 
Vitals:  
 09/03/18 5300 09/03/18 0701 09/03/18 0730 09/03/18 6300 BP: 134/87 (!) 129/92 138/88 124/73 Pulse: 68 67 65 67 Resp:  18  18 Temp: 98.1 °F (36.7 °C)   97.7 °F (36.5 °C) SpO2:  98% Weight:      
Height:      
  
 
Physical Exam: 
Patient without distress. Heart: Regular rate and rhythm Lung: clear to auscultation throughout lung fields, no wheezes, no rales, no rhonchi and normal respiratory effort Abdomen: soft, nontender Cervical Exam: 3 cm dilated 80% effaced   
-1 station Membranes:  Artificial Rupture of Membranes; Amniotic Fluid: medium amount of clear fluid Fetal Heart Rate: Reactive Prenatal Labs:  
Lab Results Component Value Date/Time ABO/Rh(D) A POSITIVE 09/02/2018 06:26 PM  
 
 
Impression/Plan:  
 
Principal Problem: 
  Gestational diabetes (9/3/2018) Active Problems: 
  Normal labor (9/2/2018) HTN (hypertension) (9/3/2018) Overview: controlled without medication Plan: Admit for induction of labor. Group B Strep negative. Signed By:  Terrance Mancia MD   
 September 3, 2018

## 2018-09-03 NOTE — ANESTHESIA PREPROCEDURE EVALUATION
Anesthetic History No history of anesthetic complications Review of Systems / Medical History Patient summary reviewed, nursing notes reviewed and pertinent labs reviewed Pulmonary Within defined limits Neuro/Psych  
 
seizures Comments: Single episode of a childhood seizure, no medications Cardiovascular Hypertension Comments: H/O HTN before pregnancy but no medications GI/Hepatic/Renal 
Within defined limits Endo/Other Diabetes Other Findings Comments: Gestational diabetes, diet controlled only Physical Exam 
 
Airway Mallampati: I 
TM Distance: 4 - 6 cm Neck ROM: normal range of motion Mouth opening: Normal 
 
 Cardiovascular Rhythm: regular Dental 
No notable dental hx Pulmonary Breath sounds clear to auscultation Abdominal 
GI exam deferred Other Findings Anesthetic Plan ASA: 2 Anesthesia type: epidural 
 
 
 
 
 
Anesthetic plan and risks discussed with: Patient and Spouse

## 2018-09-03 NOTE — PROGRESS NOTES
09/03/18 6918 Cervical Exam  
Dilation (cm) 1 Eff 75 % Station -1 Position Posterior Cervical Consistency Soft Vaginal exam done by? Hakeem Landrum RN Third dose of cytotec administered per orders.

## 2018-09-03 NOTE — L&D DELIVERY NOTE
Delivery Note    Obstetrician:  Julio Nicholson MD    Assistant: none    Pre-Delivery Diagnosis: Term pregnancy, Induced labor and Pregnancy complicated by: GDM and Chronic HTN    Post-Delivery Diagnosis: Living  infant(s), Male and first name pending, miguel angel Worthington 7-15    Intrapartum Event: Extended fetal tachycardia    Procedure: Vacuum assisted delivery      Delivery Summary    Patient: Mark Benítez MRN: [id number]  SSN: [id number]    YOB: 1985  Age: 28 y.o. Sex: female        Information for the patient's :  Macy Vann [[id number]]       Labor Events:    Labor: No   Rupture Date: 9/3/2018   Rupture Time: 8:43 AM   Rupture Type AROM   Amniotic Fluid Volume: Moderate    Amniotic Fluid Description: Clear None   Induction: Oxytocin       Augmentation: None   Labor Events: None     Cervical Ripenin2018 7:26 PM Misoprostol     Delivery Events:  Episiotomy: Midline   Laceration(s): Second degree perineal     Repaired: Yes    Number of Repair Packets: 2   Suture Type and Size: Rapide 3-0  Vicryl 2-0     Estimated Blood Loss (ml): 300ml       Delivery Date: 9/3/2018    Delivery Time: 6:35 PM  Delivery Type: Vaginal, Vacuum (Extractor)  Vacuum attempted? No    Vacuum indication: Fetal Heart Rate or Rhythm Abnormality; Maternal Fatigue   Vacuum type: bell cup   Application location: Outlet   First Attempt     Time applied:     Time removed:     Second Attempt    Time applied:     Time removed: Third Attempt    Time applied:     Time removed:     Number of pulls: 1   Number of pop-offs: 0   Low-end pressure range: 0   High-end pressure range: 965   Total application time:     Applied by: Failed? 0   Sex:   Male     Gestational Age: 36w3d  Delivery Clinician:  Julio Nicholson  Living Status: Living   Delivery Location: L&D            APGARS  One minute Five minutes Ten minutes   Skin color: 1   1        Heart rate: 2   2        Grimace: 2 2        Muscle tone: 2   2        Breathin   2        Totals: 9   9          Presentation: Vertex    Position: Left Occiput Anterior  Resuscitation Method:  Suctioning-bulb; Tactile Stimulation     Meconium Stained: None      Cord Vessels: 3 Vessels      Cord Events:    Cord Blood Sent?:  Yes    Blood Gases Sent?:  Yes    Placenta:  Date/Time: 9/3  6:42 PM  Removal: Expressed      Appearance: Normal;Intact; Other (comment)     [place] Measurements:  Birth Weight: 3.61 kg      Birth Length: 54.5 cm      Head Circumference: 34 cm      Chest Circumference: 32.5 cm     Abdominal Girth: Other Providers:   Baldo BARCLAY;ZORAIDA ARTEAGA;;BRIAN WILKINS;JEANETH ESQUEDA;SHARON HOGAN;RENAN PARKER;BAILEY TIPTON;HORACIO BLANCAS, Obstetrician;Primary Nurse;Primary [place] Nurse;Neonatologist;Anesthesiologist;Crna; Charge Nurse;Respiratory Therapist;Scrub Tech     The indication, risks, and benefits of vacuum delivery compared to  delivery were discussed with the patient and attendant family member. All questions were answered. Bladder was drained prior to instrumentation. Instrumentation easily achieved and positioning was checked and verified prior to attempt at deliver. Group B Strep: No results found for: Desirae Morales  Information for the patient's :  Kailyn Branch [[id number]]   No results found for: CHANA Lane    Lab Results   Component Value Date/Time    APH 7.269 2018 06:35 PM    APCO2 64 (H) 2018 06:35 PM    APO2 21 (H) 2018 06:35 PM    AHCO3 29 (H) 2018 06:35 PM    ABEC 0.0 2018 06:35 PM    EPHV 7.383 2018 06:35 PM    PCO2V 43 2018 06:35 PM    PO2V 33 2018 06:35 PM    HCO3V 25 2018 06:35 PM    EBDV 0.3 (L) 2018 06:35 PM    SITE CORD 2018 06:35 PM    SITE CORD 2018 06:35 PM    RSCOM na at 9 3 2018 6 44 48 PM. Not read back.  2018 06:35 PM    RSCOM na at 9 3 2018 6 52 01 PM. Not read back. 2018 06:35 PM                Complications:  none           Cord Blood Results:   Information for the patient's :  John Mccord [[id number]]   No results found for: PCTABR, PCTDIG, BILI, ABORH    Prenatal Labs:     Lab Results   Component Value Date/Time    ABO/Rh(D) A POSITIVE 2018 06:26 PM      After 2+ hours pushing, laMLEst 15 minutes with tachycardia, little progress a low vacuum applied, one easy pull with a 2nd MLE. Cord clamping and cut, cord blood harvesting done, kit done,  Placenta intact. UE clear. Repair in regular fashion, RV ok.        Attending Attestation: I was present and scrubbed for the entire procedure    Signed By:  Rolo Forrest MD     September 3, 2018

## 2018-09-03 NOTE — PROGRESS NOTES
09/02/18 2344 Cervical Exam  
Dilation (cm) 1 Eff 75 % Station -1 Position Posterior Cervical Consistency Soft Vaginal exam done by?  Arna Osgood, RN  
second dose of cytotec administered. Patient states pain 1/10. Declines intervention at this time. Will continue to monitor.

## 2018-09-03 NOTE — PROGRESS NOTES
Labor Progress Note Patient seen, fetal heart rate and contraction pattern evaluated, patient examined. Patient Vitals for the past 1 hrs: 
 BP Pulse 09/03/18 1716 123/73 79  
09/03/18 1701 123/67 88  
09/03/18 1646 128/71 96 Physical Exam: 
Cervical Exam:  10 cm dilated 100% effaced +2 station Membranes:  Artificial Rupture of Membranes; Amniotic Fluid: medium amount of clear fluid Uterine Activity: Intensity: moderate Fetal Heart Rate: Reactive Assessment/Plan: 
Reassuring fetal status, Continue plan for vaginal delivery, pushing for 1 hours

## 2018-09-03 NOTE — PROGRESS NOTES
DR Abigail Bang at bedside strip reviewed. SVE 3/80/-1. AROM clear fluid noted. Pt may have epidural when ready.

## 2018-09-03 NOTE — PROGRESS NOTES
Patient repositioned to left lateral position on peanut-shaped birthing ball to assist in labor descent 09/03/18 1446 Maternal Vital Signs Temp 97.6 °F (36.4 °C) Temp Source Oral  
Pulse (Heart Rate) 64 Resp Rate 18 /62 MAP (Calculated) 79 BP Patient Position Lying left side Fetal Vital Signs Mode External  
Fetal Heart Rate 130 Variability 6-25 BPM  
Decelerations Early Accelerations No  
RN Reviewed Strip? Yes FHR Interventions Lateral Left; Other (comment) (birthing ball) Uterine Activity Mode External  
Frequency (min) 4-6 Duration (sec)  Uterine Pattern Description Normal  
Intensity Moderate Uterine Resting Tone Relaxed Pain 1 Pain Scale 1 Numeric (0 - 10) Pain Intensity 1 1 Pain Reassessment 1 Yes Oxygen Therapy O2 Device Room air Patient Observation Repositioned Birthing ball Patient Turned Turn on left side

## 2018-09-03 NOTE — PROGRESS NOTES
Pain medication given 10:13 Medication Given butorphanol (STADOL) injection 1 mg -  Dose: 1 mg ; Route: IntraVENous ; Line: Peripheral IV 09/02/18 Left Wrist Sharri Oates RN  
 
 
 
 
 
 09/03/18 1017 Pain 1 Pain Scale 1 Numeric (0 - 10) Pain Intensity 1 7 Pain Location 1 Abdomen;Back Pain Description 1 Cramping; Antonia Velázquez Pain Intervention(s) 1 Medication (see MAR)

## 2018-09-03 NOTE — PROGRESS NOTES
Dr Marizol Gonzalez given update on patient status; reported SVE 5cm, pt comfortable with epidural, pitocin started as ordered; no new orders received 09/03/18 1334 Maternal Vital Signs Pulse (Heart Rate) 67 /74 MAP (Calculated) 87 BP Patient Position At rest;Hip tilt, right Fetal Vital Signs Mode External  
Fetal Heart Rate 135 Fetal Activity Present Variability 6-25 BPM  
Decelerations Early Accelerations Yes RN Reviewed Strip? Yes  
Uterine Activity Mode External  
Frequency (min) 4-5 Duration (sec)  Pain 1 Pain Scale 1 Numeric (0 - 10) Pain Intensity 1 0 Pain Reassessment 1 Yes Oxygen Therapy O2 Device Room air Cervical Exam  
Dilation (cm) 5 Eff 80 % Vaginal Discharge Vaginal Discharge Yes Color Clear Consistency Watery Odor None Amount Scant Edema LLE Trace RLE Trace Pre-Eclampsia Assessment Headache No  
Visual Disturbances No  
Epigastric Pain No

## 2018-09-04 PROCEDURE — 74011250637 HC RX REV CODE- 250/637: Performed by: OBSTETRICS & GYNECOLOGY

## 2018-09-04 PROCEDURE — 65270000029 HC RM PRIVATE

## 2018-09-04 RX ADMIN — IBUPROFEN 600 MG: 600 TABLET, FILM COATED ORAL at 04:00

## 2018-09-04 RX ADMIN — WITCH HAZEL 1 PAD: 500 SOLUTION RECTAL; TOPICAL at 20:42

## 2018-09-04 RX ADMIN — IBUPROFEN 600 MG: 600 TABLET, FILM COATED ORAL at 15:58

## 2018-09-04 RX ADMIN — IBUPROFEN 600 MG: 600 TABLET, FILM COATED ORAL at 21:40

## 2018-09-04 RX ADMIN — DOCUSATE SODIUM 100 MG: 100 CAPSULE, LIQUID FILLED ORAL at 09:53

## 2018-09-04 RX ADMIN — IBUPROFEN 600 MG: 600 TABLET, FILM COATED ORAL at 09:53

## 2018-09-04 NOTE — PROGRESS NOTES
Chart reviewed due to first time parent - no needs identified.  met with family and provided education on [facility] [place] Home Visit Program.  Family declined referral for home visit.  provided informational packet on  mood disorder education/resources. Family receptive to receiving information and denied any additional needs from . Family has this 's contact information should any needs/questions arise. Renea Page, [address] [facility]

## 2018-09-04 NOTE — LACTATION NOTE
Followed up with mom and infant. Infant asleep in the crib. Mom wanted to attempt to latch infant. Undressed infant and placed him to mom's right breast in football hold. Infant still sleepy. Infant latched and took 3-4 sucks and fell back to sleep. Informed mom that she should start pumping. She wanted to use her on breast pump. Demonstrated to mom how to use pump and clean pump kit. Mom pumped 15 minutes and expressed drops. Mom fed it to infant on her finger. Infant more alert. Latched on breast and again took only a few sucks. Reviewed second night again and need to pump and feed expressed colostrum to infant if he does not wake up and nurse well tonight. Lactation consultant will follow up tomorrow.

## 2018-09-04 NOTE — PROGRESS NOTES
09/03/18 1576 Pain Assessment Pain Scale 1 Numeric (0 - 10) Pain Intensity 1 2 Pain Location 1 Perineum Pain Description 1 Aching; Sore Pain Intervention(s) 1 Medication (see MAR) PRN Motrin 800mg for pain

## 2018-09-04 NOTE — LACTATION NOTE

## 2018-09-04 NOTE — PROGRESS NOTES
SBAR IN Report: Mother Verbal report received from Sravanthi Daniels RN on this patient, who is now being transferred from L&D for routine progression of care. The patient is not wearing a green \"Anesthesia-Duramorph\" band. Report consisted of patient's Situation, Background, Assessment and Recommendations (SBAR). [place] ID bands were compared with the identification form, and verified with the patient and transferring nurse. Information from the SBAR and the [place] Report was reviewed with the transferring nurse; opportunity for questions and clarification provided.

## 2018-09-04 NOTE — LACTATION NOTE
In to see mom and infant for the first time. Mom stated that infant has been very sleepy and will not wake up to nurse. Reviewed with mom and dad the expectations of the first 24 hours as well as the second night of life. Undressed infant and woke him up and placed infant to mom's left breast in the cross cradle hold. infant was very sleepy. Did latch but no suck. . We then placed infant to mom's right breast in the football hold and after much stimulation did wake up enough to take several sucks on and off for 15 minutes. Assisted mom with attempt to latch infant on both breasts. Instructed mom to attempt to latch infant every 3 hours if he dose not wake up cueing. Informed mom that I would follow up later today.

## 2018-09-04 NOTE — PROGRESS NOTES
09/04/18 0400 Pain Assessment Pain Scale 1 Numeric (0 - 10) Pain Intensity 1 2 Pain Location 1 Rectal;Perineum Pain Description 1 Aching; Sore Pain Intervention(s) 1 Medication (see MAR) PRN Motrin 600 mg for pain

## 2018-09-04 NOTE — PROGRESS NOTES
Post-Partum Day Number 1 Progress Note Patient doing well post-partum without significant complaint. Voiding withour difficulty, normal lochia. Vitals:  Patient Vitals for the past 8 hrs: 
 BP Temp Pulse Resp SpO2  
18 0655 111/65 97.6 °F (36.4 °C) 78 18 98 % 18 0345 109/69 97.7 °F (36.5 °C) 79 18 - Temp (24hrs), Av.8 °F (36.6 °C), Min:97.5 °F (36.4 °C), Max:98.9 °F (37.2 °C) Vital signs stable, afebrile. Exam:  Patient without distress. Abdomen soft, fundus firm at level of umbilicus, nontender Perineum with normal lochia noted. Lower extremities are negative for swelling, cords or tenderness. Lab/Data Review: All lab results for the last 24 hours reviewed. Assessment and Plan:  Patient appears to be having uncomplicated post-partum course. Continue routine perineal care and maternal education.   Plan discharge tomorrow if no problems occur.  +/+,  GBS-

## 2018-09-04 NOTE — PROGRESS NOTES
SBAR OUT Report: Mother Verbal report given to Mckayla Crockett RN (full name & credentials) on this patient, who is now being transferred to MIU (unit) for routine progression of care. The patient is not wearing a green \"Anesthesia-Duramorph\" band. Report consisted of patient's Situation, Background, Assessment and Recommendations (SBAR). [place] ID bands were compared with the identification form, and verified with the patient and receiving nurse. Information from the SBAR, Kardex, Procedure Summary, Intake/Output, MAR, Accordion, Recent Results and Med Rec Status and the [place] Report was reviewed with the receiving nurse; opportunity for questions and clarification provided. Fundus checked during report with Mckayla Crockett RN.

## 2018-09-05 VITALS
BODY MASS INDEX: 29.77 KG/M2 | SYSTOLIC BLOOD PRESSURE: 122 MMHG | OXYGEN SATURATION: 96 % | TEMPERATURE: 97.8 F | HEIGHT: 63 IN | DIASTOLIC BLOOD PRESSURE: 83 MMHG | RESPIRATION RATE: 16 BRPM | HEART RATE: 73 BPM | WEIGHT: 168 LBS

## 2018-09-05 PROCEDURE — 74011250637 HC RX REV CODE- 250/637: Performed by: OBSTETRICS & GYNECOLOGY

## 2018-09-05 RX ORDER — IBUPROFEN 600 MG/1
600 TABLET ORAL
Qty: 30 TAB | Refills: 0 | Status: ON HOLD | OUTPATIENT
Start: 2018-09-05 | End: 2020-05-28 | Stop reason: SDUPTHER

## 2018-09-05 RX ORDER — DOCUSATE SODIUM 100 MG/1
100 CAPSULE, LIQUID FILLED ORAL
Qty: 60 CAP | Refills: 2 | Status: SHIPPED | OUTPATIENT
Start: 2018-09-05 | End: 2018-12-04

## 2018-09-05 RX ORDER — OXYCODONE AND ACETAMINOPHEN 5; 325 MG/1; MG/1
1 TABLET ORAL
Qty: 15 TAB | Refills: 0 | Status: SHIPPED | OUTPATIENT
Start: 2018-09-05 | End: 2020-05-28

## 2018-09-05 RX ADMIN — DOCUSATE SODIUM 100 MG: 100 CAPSULE, LIQUID FILLED ORAL at 11:31

## 2018-09-05 RX ADMIN — IBUPROFEN 600 MG: 600 TABLET, FILM COATED ORAL at 04:55

## 2018-09-05 RX ADMIN — WITCH HAZEL 1 PAD: 500 SOLUTION RECTAL; TOPICAL at 11:36

## 2018-09-05 RX ADMIN — IBUPROFEN 600 MG: 600 TABLET, FILM COATED ORAL at 11:30

## 2018-09-05 NOTE — DISCHARGE SUMMARY
Obstetrical Discharge Summary     Name: Chris Schultz MRN: [id number]  SSN: [id number]    YOB: 1985  Age: 28 y.o. Sex: female      Allergies: Review of patient's allergies indicates no known allergies. Admit Date: 2018    Discharge Date: 2018     Admitting Physician: Carrie White MD     Attending Physician:  Carrie White MD     * Admission Diagnoses: LABOR  Normal labor    * Discharge Diagnoses:   Information for the patient's :  Reena Fajardo [[id number]]   Delivery of a 3.61 kg male infant via Vaginal, Vacuum (Extractor) on 9/3/2018 at 6:35 PM  by . Apgars were 9 and 9. Additional Diagnoses:   Hospital Problems as of 2018  Date Reviewed: 9/3/2018          Codes Class Noted - Resolved POA    HTN (hypertension) ICD-10-CM: I10  ICD-9-CM: 401.9  9/3/2018 - Present Unknown    Overview Signed 9/3/2018  8:38 AM by Carrie White MD     controlled without medication             * (Principal)Gestational diabetes ICD-10-CM: X63.156  ICD-9-CM: 648.80  9/3/2018 - Present Unknown        Normal labor ICD-10-CM: O80, Z37.9  ICD-9-CM: 618  2018 - Present Unknown             Lab Results   Component Value Date/Time    ABO/Rh(D) A POSITIVE 2018 06:26 PM      There is no immunization history on file for this patient. * Procedures:   * No surgery found *           * Discharge Condition: good    [facility] Course: Normal hospital course following the delivery. On PPD #2, patient was meeting all milestones including tolerating a regular diet without nausea or vomiting, ambulating and voiding well. Her pain was well controlled with PO meds and she was having normal lochia. Breastfeeding going well. She was stable for discharge. H/o CHTN, normotensive. No symptoms of PIH. H/o GDM- needs 2 hr gtt at 6-12 weeks PP. Blood pressure 122/83, pulse 73, temperature 97.8 °F (36.6 °C), resp.  rate 16, height 5' 3\" (1.6 m), weight 76.2 kg (168 lb), SpO2 96 %, currently breastfeeding. Gen- NAD  Abd- soft, FF@ U, NT  Ext- NT, no cords, no edema   Mary- scant lochia        * Disposition: Home    Discharge Medications:   Current Discharge Medication List      START taking these medications    Details   docusate sodium (COLACE) 100 mg capsule Take 1 Cap by mouth two (2) times daily as needed for Constipation for up to 90 days. Qty: 60 Cap, Refills: 2      ibuprofen (MOTRIN) 600 mg tablet Take 1 Tab by mouth every six (6) hours as needed. Qty: 30 Tab, Refills: 0      oxyCODONE-acetaminophen (PERCOCET) 5-325 mg per tablet Take 1 Tab by mouth every six (6) hours as needed. Max Daily Amount: 4 Tabs. Qty: 15 Tab, Refills: 0    Associated Diagnoses: Vaginal delivery         CONTINUE these medications which have NOT CHANGED    Details   prenatal vit-iron fumarate-fa (RIGHT STEP PRENATAL VITAMINS) 27 mg iron- 0.8 mg tab tablet Take 1 Tab by mouth daily. ferrous sulfate (IRON) 325 mg (65 mg iron) tablet Take 325 mg by mouth Daily (before breakfast). STOP taking these medications       norethindrone-e.estradiol-iron 1 mg-20 mcg(24) /75 mg (4) chew Comments:   Reason for Stopping:               * Follow-up Care/Patient Instructions:   Activity: Activity as tolerated  Diet: Regular Diet  Wound Care: Keep wound clean and dry    Follow-up Information     Follow up With Details Comments MD Tiny   [address] [place] [address] [address]  [address]  [phone]             Signed By:  Arsh Bernal MD     September 5, 2018

## 2018-09-05 NOTE — LACTATION NOTE
This note was copied from a baby's chart. Individualized Feeding Plan for Breastfeeding Lactation Services [phone] As much as possible, hold your baby on your chest so babys bare skin is against your bare skin with a blanket covering babys back, especially 30 minutes before it is time for baby to eat. Watch for early feeding cues such as, licking lips, sucking motions, rooting, hands to mouth. Crying is a late feeding cue. Feed your baby at least 8 times in 24 hours, or more if your baby is showing feeding cues. If baby is sleepy put baby skin to skin and watch for hunger cues. To rouse baby: unwrap, undress, massage hands, feet, & back, change diaper, gently change babys position from lying to sitting. 15-20 minutes on the first breast of active breastfeeding is considered a good feeding. Good, active breastfeeding is when baby is alert, tugging the nipple, their ear may move, and you can hear swallows. Allow baby to finish the first side before changing sides. Sleeping at the breast or only brief, light sucks should not be considered a good, full breastfeed. At each feeding: 
__x__1. Do Suck Practice on finger before each feeding until sucking pattern is smooth. Try using index finger. Nail down towards tongue. __x__2. Hand Express for a few minutes prior to latching to help start milk flow. __x__3. Baby needs to NURSE WELL x 15-20 minutes on at least first breast, burp and offer 2nd breast at every feeding. If no sustained latch only attempt at breast for 10 minutes. If baby does not latch on and feed well on at least one side, you should:  
__x__4. Double pump for 15 minutes with breast massage and compression. Hand express for an additional 2-3 minutes per side. Pump after each feeding attempt or poor feeding, up to 8 times per day. If you are not putting baby to the breast you need to pump 8 times a day. Pump every at least every 3 hours. __x__5. Give baby all of the breast milk you obtain using a straight syringe or  curved syringe. If baby does NOT have enough wet and dirty diapers per day, is jaundiced/lethargic, or has significant weight loss AND you do NOT pump enough milk for each feeding (per volume listed below), formula supplementation may need to be used. Call lactation department /pediatrician if you have concerns. AVERAGE INTAKES OF COLOSTRUM BY HEALTHY  INFANTS: 
Time  Day Intake (ml/feed)  Based on 8 feedings per day. 1st 24 hrs  1 2-10 ml 
24-48 hrs  2 5-15 ml 
48-72 hrs  3 15-30 ml (0.5-1 oz) 72-96 hrs  4 30-45 ml (1-1.5oz) 5-6      45-60 ml (1.5-2oz) 7        75-90 ml (2.5-3oz) By day 7, baby will need 80 ml or 2.6 oz at each feeding based on 8 feedings per day & babys weight. (1oz = 30ml). Total milk volume needed in 24 hours by Day 7 is 21.5 oz per day based on baby's birthweight of 7lb 15 oz. The more often baby eats, the less volume they need per feeding. If baby is eating more often than the minimum of 8 times per day, they may take less per feeding. Comments:  
 
Use feeding plan until follow up with pediatrician. Continue to attempt at the breast for most feeds. Pump every 3 hours if no latch. Give all pumped colostrum/breastmilk at each feeding. OUTPATIENT APPOINTMENT SCHEDULED FOR :  
Outpatient services are located on the 4th floor at [facility]. Check in at the 4th floor registration desk (the same one you used when you came to have your baby). Call for questions [phone]

## 2018-09-05 NOTE — PROGRESS NOTES
09/04/18 1959 Pain Assessment Pain Scale 1 Numeric (0 - 10) Pain Intensity 1 1 Pain Location 1 Perineum Pain Description 1 Aching; Sore Pain Intervention(s) 1 Medication (see MAR) Motrin 600 mg po for pain

## 2018-09-05 NOTE — DISCHARGE INSTRUCTIONS
Vaginal Childbirth: Care Instructions  Your Care Instructions    Your body will slowly heal in the next few weeks. It is easy to get too tired and overwhelmed during the first weeks after your baby is born. Changes in your hormones can shift your mood without warning. You may find it hard to meet the extra demands on your energy and time. Take it easy on yourself. Follow-up care is a key part of your treatment and safety. Be sure to make and go to all appointments, and call your doctor if you are having problems. It's also a good idea to know your test results and keep a list of the medicines you take. How can you care for yourself at home? · Vaginal bleeding and cramps  ¨ After delivery, you will have a bloody discharge from the vagina. This will turn pink within a week and then white or yellow after about 10 days. It may last for 2 to 4 weeks or longer, until the uterus has healed. Use pads instead of tampons until you stop bleeding. ¨ Do not worry if you pass some blood clots, as long as they are smaller than a golf ball. If you have a tear or stitches in your vaginal area, change the pad at least every 4 hours to prevent soreness and infection. ¨ You may have cramps for the first few days after childbirth. These are normal and occur as the uterus shrinks to normal size. Take an over-the-counter pain medicine, such as acetaminophen (Tylenol), ibuprofen (Advil, Motrin), or naproxen (Aleve), for cramps. Read and follow all instructions on the label. Do not take aspirin, because it can cause more bleeding. ¨ Do not take two or more pain medicines at the same time unless the doctor told you to. Many pain medicines have acetaminophen, which is Tylenol. Too much acetaminophen (Tylenol) can be harmful. · Stitches  ¨ If you have stitches, they will dissolve on their own and do not need to be removed. Follow your doctor's instructions for cleaning the stitched area.   ¨ Put ice or a cold pack on your painful area for 10 to 20 minutes at a time, several times a day, for the first few days. Put a thin cloth between the ice and your skin. ¨ Sit in a few inches of warm water (sitz bath) 3 times a day and after bowel movements. The warm water helps with pain and itching. If you do not have a tub, a warm shower might help. · Breast fullness  ¨ Your breasts may overfill (engorge) in the first few days after delivery. To help milk flow and to relieve pain, warm your breasts in the shower or by using warm, moist towels before nursing. ¨ If you are not nursing, do not put warmth on your breasts or touch your breasts. Wear a tight bra or sports bra and use ice until the fullness goes away. This usually takes 2 to 3 days. ¨ Put ice or a cold pack on your breast after nursing to reduce swelling and pain. Put a thin cloth between the ice and your skin. · Activity  ¨ Eat a balanced diet. Do not try to lose weight by cutting calories. Keep taking your prenatal vitamins, or take a multivitamin. ¨ Get as much rest as you can. Try to take naps when your baby sleeps during the day. ¨ Get some exercise every day. But do not do any heavy exercise until your doctor says it is okay. ¨ Wait until you are healed (about 4 to 6 weeks) before you have sexual intercourse. Your doctor will tell you when it is okay to have sex. ¨ Talk to your doctor about birth control. You can get pregnant even before your period returns. Also, you can get pregnant while you are breastfeeding. · Mental health  ¨ It is normal to have some sadness, anxiety, sleeplessness, and mood swings after you go home. If you feel upset or hopeless for more than a few days or are having trouble doing the things you need to do, talk to your doctor. · Constipation and hemorrhoids  ¨ Drink plenty of fluids, enough so that your urine is light yellow or clear like water.  If you have kidney, heart, or liver disease and have to limit fluids, talk with your doctor before you increase the amount of fluids you drink. ¨ Eat plenty of fiber each day. Have a bran muffin or bran cereal for breakfast, and try eating a piece of fruit for a mid-afternoon snack. ¨ For painful, itchy hemorrhoids, put ice or a cold pack on the area several times a day for 10 minutes at a time. Follow this by putting a warm compress on the area for another 10 to 20 minutes or by sitting in a shallow, warm bath. When should you call for help? Call 911 anytime you think you may need emergency care. For example, call if:    · You passed out (lost consciousness).    Call your doctor now or seek immediate medical care if:    · You have severe vaginal bleeding.     · You are dizzy or lightheaded, or you feel like you may faint.     · You have a fever.     · You have new or more pain in your belly or pelvis.    Watch closely for changes in your health, and be sure to contact your doctor if:    · Your vaginal bleeding seems to be getting heavier.     · You have new or worse vaginal discharge.     · You feel sad, anxious, or hopeless for more than a few days.     · You do not get better as expected. Where can you learn more? Go to http://nikolas-izabela.info/. Enter Y795 in the search box to learn more about \"Vaginal Childbirth: Care Instructions. \"  Current as of: November 21, 2017  Content Version: 11.7  © 7877-1200 [employer]. Care instructions adapted under license by [employer] (which disclaims liability or warranty for this information). If you have questions about a medical condition or this instruction, always ask your healthcare professional. [place] [address] any warranty or liability for your use of this information.

## 2018-09-05 NOTE — PROGRESS NOTES
Pt discharged to home after ID bands verified and newborns code alert removed. Discharge teaching complete, pt verbalizes understanding; questions encouraged. Mom transferred by wheelchairto vehicle, while dad carried baby to car and placed in rear facing car seat. Stable at discharge.

## 2019-05-31 ENCOUNTER — APPOINTMENT (RX ONLY)
Dept: URBAN - METROPOLITAN AREA CLINIC 349 | Facility: CLINIC | Age: 34
Setting detail: DERMATOLOGY
End: 2019-05-31

## 2019-05-31 DIAGNOSIS — D22 MELANOCYTIC NEVI: ICD-10-CM

## 2019-05-31 DIAGNOSIS — D18.0 HEMANGIOMA: ICD-10-CM

## 2019-05-31 DIAGNOSIS — L82.1 OTHER SEBORRHEIC KERATOSIS: ICD-10-CM

## 2019-05-31 DIAGNOSIS — Z71.89 OTHER SPECIFIED COUNSELING: ICD-10-CM

## 2019-05-31 PROBLEM — D18.01 HEMANGIOMA OF SKIN AND SUBCUTANEOUS TISSUE: Status: ACTIVE | Noted: 2019-05-31

## 2019-05-31 PROBLEM — D22.5 MELANOCYTIC NEVI OF TRUNK: Status: ACTIVE | Noted: 2019-05-31

## 2019-05-31 PROCEDURE — 99213 OFFICE O/P EST LOW 20 MIN: CPT

## 2019-05-31 PROCEDURE — ? COUNSELING

## 2019-05-31 ASSESSMENT — LOCATION DETAILED DESCRIPTION DERM
LOCATION DETAILED: LEFT INFERIOR CENTRAL MALAR CHEEK
LOCATION DETAILED: LEFT MEDIAL SUPERIOR CHEST
LOCATION DETAILED: RIGHT LATERAL PROXIMAL PRETIBIAL REGION
LOCATION DETAILED: INFERIOR THORACIC SPINE
LOCATION DETAILED: LEFT LATERAL ABDOMEN

## 2019-05-31 ASSESSMENT — LOCATION ZONE DERM
LOCATION ZONE: FACE
LOCATION ZONE: LEG
LOCATION ZONE: TRUNK

## 2019-05-31 ASSESSMENT — LOCATION SIMPLE DESCRIPTION DERM
LOCATION SIMPLE: CHEST
LOCATION SIMPLE: UPPER BACK
LOCATION SIMPLE: RIGHT PRETIBIAL REGION
LOCATION SIMPLE: LEFT CHEEK
LOCATION SIMPLE: ABDOMEN

## 2020-05-27 ENCOUNTER — ANESTHESIA EVENT (OUTPATIENT)
Dept: SURGERY | Age: 35
End: 2020-05-27
Payer: COMMERCIAL

## 2020-05-28 ENCOUNTER — HOSPITAL ENCOUNTER (OUTPATIENT)
Age: 35
Setting detail: OUTPATIENT SURGERY
Discharge: HOME | End: 2020-05-28
Attending: OBSTETRICS & GYNECOLOGY | Admitting: OBSTETRICS & GYNECOLOGY
Payer: COMMERCIAL

## 2020-05-28 ENCOUNTER — ANESTHESIA (OUTPATIENT)
Dept: SURGERY | Age: 35
End: 2020-05-28
Payer: COMMERCIAL

## 2020-05-28 VITALS
WEIGHT: 152.3 LBS | SYSTOLIC BLOOD PRESSURE: 129 MMHG | HEIGHT: 63 IN | HEART RATE: 60 BPM | RESPIRATION RATE: 16 BRPM | DIASTOLIC BLOOD PRESSURE: 69 MMHG | TEMPERATURE: 98.1 F | BODY MASS INDEX: 26.98 KG/M2 | OXYGEN SATURATION: 97 %

## 2020-05-28 PROBLEM — N96 RECURRENT PREGNANCY LOSS: Status: ACTIVE | Noted: 2020-05-28

## 2020-05-28 PROBLEM — O02.1 MISSED ABORTION: Status: ACTIVE | Noted: 2020-05-28

## 2020-05-28 PROCEDURE — 77030010509 HC AIRWY LMA MSK TELE -A: Performed by: ANESTHESIOLOGY

## 2020-05-28 PROCEDURE — 77030020164: Performed by: OBSTETRICS & GYNECOLOGY

## 2020-05-28 PROCEDURE — 74011250636 HC RX REV CODE- 250/636: Performed by: ANESTHESIOLOGY

## 2020-05-28 PROCEDURE — 76010000154 HC OR TIME FIRST 0.5 HR: Performed by: OBSTETRICS & GYNECOLOGY

## 2020-05-28 PROCEDURE — 74011250636 HC RX REV CODE- 250/636: Performed by: NURSE ANESTHETIST, CERTIFIED REGISTERED

## 2020-05-28 PROCEDURE — 74011250636 HC RX REV CODE- 250/636: Performed by: OBSTETRICS & GYNECOLOGY

## 2020-05-28 PROCEDURE — 74011000250 HC RX REV CODE- 250: Performed by: NURSE ANESTHETIST, CERTIFIED REGISTERED

## 2020-05-28 PROCEDURE — 77030008579 HC TBNG UTER SUC CARD -A: Performed by: OBSTETRICS & GYNECOLOGY

## 2020-05-28 PROCEDURE — 77030018836 HC SOL IRR NACL ICUM -A: Performed by: OBSTETRICS & GYNECOLOGY

## 2020-05-28 PROCEDURE — 76210000020 HC REC RM PH II FIRST 0.5 HR: Performed by: OBSTETRICS & GYNECOLOGY

## 2020-05-28 PROCEDURE — 76210000063 HC OR PH I REC FIRST 0.5 HR: Performed by: OBSTETRICS & GYNECOLOGY

## 2020-05-28 PROCEDURE — 76060000032 HC ANESTHESIA 0.5 TO 1 HR: Performed by: OBSTETRICS & GYNECOLOGY

## 2020-05-28 RX ORDER — SODIUM CHLORIDE 0.9 % (FLUSH) 0.9 %
5-40 SYRINGE (ML) INJECTION AS NEEDED
Status: DISCONTINUED | OUTPATIENT
Start: 2020-05-28 | End: 2020-05-28 | Stop reason: HOSPADM

## 2020-05-28 RX ORDER — SODIUM CHLORIDE 0.9 % (FLUSH) 0.9 %
5-40 SYRINGE (ML) INJECTION EVERY 8 HOURS
Status: DISCONTINUED | OUTPATIENT
Start: 2020-05-28 | End: 2020-05-28 | Stop reason: HOSPADM

## 2020-05-28 RX ORDER — OXYCODONE HYDROCHLORIDE 5 MG/1
5 TABLET ORAL
Status: DISCONTINUED | OUTPATIENT
Start: 2020-05-28 | End: 2020-05-28 | Stop reason: HOSPADM

## 2020-05-28 RX ORDER — LIDOCAINE HYDROCHLORIDE 10 MG/ML
0.1 INJECTION INFILTRATION; PERINEURAL AS NEEDED
Status: DISCONTINUED | OUTPATIENT
Start: 2020-05-28 | End: 2020-05-28 | Stop reason: HOSPADM

## 2020-05-28 RX ORDER — PROPOFOL 10 MG/ML
INJECTION, EMULSION INTRAVENOUS AS NEEDED
Status: DISCONTINUED | OUTPATIENT
Start: 2020-05-28 | End: 2020-05-28 | Stop reason: HOSPADM

## 2020-05-28 RX ORDER — IBUPROFEN 600 MG/1
600 TABLET ORAL
Qty: 40 TAB | Refills: 0 | Status: SHIPPED | OUTPATIENT
Start: 2020-05-28 | End: 2021-08-31

## 2020-05-28 RX ORDER — SODIUM CHLORIDE, SODIUM LACTATE, POTASSIUM CHLORIDE, CALCIUM CHLORIDE 600; 310; 30; 20 MG/100ML; MG/100ML; MG/100ML; MG/100ML
100 INJECTION, SOLUTION INTRAVENOUS CONTINUOUS
Status: DISCONTINUED | OUTPATIENT
Start: 2020-05-28 | End: 2020-05-28 | Stop reason: HOSPADM

## 2020-05-28 RX ORDER — LIDOCAINE HYDROCHLORIDE 20 MG/ML
INJECTION, SOLUTION EPIDURAL; INFILTRATION; INTRACAUDAL; PERINEURAL AS NEEDED
Status: DISCONTINUED | OUTPATIENT
Start: 2020-05-28 | End: 2020-05-28 | Stop reason: HOSPADM

## 2020-05-28 RX ORDER — FENTANYL CITRATE 50 UG/ML
INJECTION, SOLUTION INTRAMUSCULAR; INTRAVENOUS AS NEEDED
Status: DISCONTINUED | OUTPATIENT
Start: 2020-05-28 | End: 2020-05-28 | Stop reason: HOSPADM

## 2020-05-28 RX ORDER — DEXAMETHASONE SODIUM PHOSPHATE 4 MG/ML
INJECTION, SOLUTION INTRA-ARTICULAR; INTRALESIONAL; INTRAMUSCULAR; INTRAVENOUS; SOFT TISSUE AS NEEDED
Status: DISCONTINUED | OUTPATIENT
Start: 2020-05-28 | End: 2020-05-28 | Stop reason: HOSPADM

## 2020-05-28 RX ORDER — NALOXONE HYDROCHLORIDE 0.4 MG/ML
0.04 INJECTION, SOLUTION INTRAMUSCULAR; INTRAVENOUS; SUBCUTANEOUS
Status: DISCONTINUED | OUTPATIENT
Start: 2020-05-28 | End: 2020-05-28 | Stop reason: HOSPADM

## 2020-05-28 RX ORDER — DOXYCYCLINE 100 MG/1
100 CAPSULE ORAL ONCE
Status: DISCONTINUED | OUTPATIENT
Start: 2020-05-28 | End: 2020-05-28 | Stop reason: ALTCHOICE

## 2020-05-28 RX ORDER — ONDANSETRON 2 MG/ML
INJECTION INTRAMUSCULAR; INTRAVENOUS AS NEEDED
Status: DISCONTINUED | OUTPATIENT
Start: 2020-05-28 | End: 2020-05-28 | Stop reason: HOSPADM

## 2020-05-28 RX ORDER — MIDAZOLAM HYDROCHLORIDE 1 MG/ML
2 INJECTION, SOLUTION INTRAMUSCULAR; INTRAVENOUS ONCE
Status: DISCONTINUED | OUTPATIENT
Start: 2020-05-28 | End: 2020-05-28 | Stop reason: HOSPADM

## 2020-05-28 RX ORDER — HYDROMORPHONE HYDROCHLORIDE 2 MG/ML
0.5 INJECTION, SOLUTION INTRAMUSCULAR; INTRAVENOUS; SUBCUTANEOUS
Status: DISCONTINUED | OUTPATIENT
Start: 2020-05-28 | End: 2020-05-28 | Stop reason: HOSPADM

## 2020-05-28 RX ORDER — FENTANYL CITRATE 50 UG/ML
100 INJECTION, SOLUTION INTRAMUSCULAR; INTRAVENOUS ONCE
Status: DISCONTINUED | OUTPATIENT
Start: 2020-05-28 | End: 2020-05-28 | Stop reason: HOSPADM

## 2020-05-28 RX ORDER — MIDAZOLAM HYDROCHLORIDE 1 MG/ML
2 INJECTION, SOLUTION INTRAMUSCULAR; INTRAVENOUS
Status: DISCONTINUED | OUTPATIENT
Start: 2020-05-28 | End: 2020-05-28 | Stop reason: HOSPADM

## 2020-05-28 RX ADMIN — SODIUM CHLORIDE, SODIUM LACTATE, POTASSIUM CHLORIDE, AND CALCIUM CHLORIDE: 600; 310; 30; 20 INJECTION, SOLUTION INTRAVENOUS at 13:12

## 2020-05-28 RX ADMIN — DEXAMETHASONE SODIUM PHOSPHATE 10 MG: 4 INJECTION, SOLUTION INTRAMUSCULAR; INTRAVENOUS at 13:27

## 2020-05-28 RX ADMIN — ONDANSETRON 4 MG: 2 INJECTION INTRAMUSCULAR; INTRAVENOUS at 13:27

## 2020-05-28 RX ADMIN — SODIUM CHLORIDE, SODIUM LACTATE, POTASSIUM CHLORIDE, AND CALCIUM CHLORIDE 100 ML/HR: 600; 310; 30; 20 INJECTION, SOLUTION INTRAVENOUS at 12:00

## 2020-05-28 RX ADMIN — LIDOCAINE HYDROCHLORIDE 100 MG: 20 INJECTION, SOLUTION EPIDURAL; INFILTRATION; INTRACAUDAL; PERINEURAL at 13:19

## 2020-05-28 RX ADMIN — DOXYCYCLINE 200 MG: 100 INJECTION, POWDER, LYOPHILIZED, FOR SOLUTION INTRAVENOUS at 13:12

## 2020-05-28 RX ADMIN — PROPOFOL 200 MG: 10 INJECTION, EMULSION INTRAVENOUS at 13:19

## 2020-05-28 RX ADMIN — FENTANYL CITRATE 100 MCG: 50 INJECTION INTRAMUSCULAR; INTRAVENOUS at 13:19

## 2020-05-28 NOTE — PROGRESS NOTES
Recovery completed. Discharge instructions reviewed with spouse. Pt tolerating PO without difficulty. Mary ped dry and in place. IV discontinued. Pt assisted with dressing. To car via wheelchair.

## 2020-05-28 NOTE — H&P
Gynecology History and Physical for Surgery    Name: Anna Rodriguez MRN: [id number] [id number]: [id number]    YOB: 1985  Age: 29 y.o. Sex: female       Subjective:      Chief complaint:  Missed , recurrent pregnancy loss    Deandre Holland is a 29 y.o.  female [id number] presents for Procedure(s) (LRB):  DILATATION AND CURETTAGE WITH SUCTION/ 8W/ A+ (N/A) for missed , estimated 6wks gestation. She was last seen in the office 20. She had early US measuring 6 weeks by GS with smaller fetal pole seen with no FCA. At 1 week follow up FP did not grow and still no FCA developed, confirming MAB. Options of expectant management vs medical management vs D&C were reviewed with patient and she desired to proceed with D&C. She has had no bleeding or pain. This is patient's third miscarriage, she would like genetic microarray testing of POCs today due to RPL.       OB History        1    Para   1    Term   1       0    AB   0    Living   1       SAB   0    TAB   0    Ectopic   0    Molar        Multiple   0    Live Births   1              Past Medical History:   Diagnosis Date    Gestational diabetes     H/O seasonal allergies     HTN (hypertension)     controlled without medication    Proteinuria     work up with nephrology was negative    Pseudoangiomatous stromal hyperplasia of breast     followed by Bethany De Dios [facility])     as a child, 1 isolated     Past Surgical History:   Procedure Laterality Date    HX BREAST LUMPECTOMY      HX WISDOM TEETH EXTRACTION       Social History     Occupational History    Occupation: physical therapist   Tobacco Use    Smoking status: Never Smoker    Smokeless tobacco: Never Used   Substance and Sexual Activity    Alcohol use: No     Alcohol/week: 0.0 standard drinks    Drug use: No    Sexual activity: Yes     Partners: Male     Family History   Problem Relation Age of Onset    Hypertension Father    [facility] Father         prostate    Hypertension Mother     Thyroid Disease Other     Heart Disease Paternal Grandfather     Stroke Paternal Grandfather     Heart Disease Maternal Grandfather     Stroke Paternal Grandmother     Breast Cancer Paternal Aunt         4 aunts        No Known Allergies  Prior to Admission medications    Medication Sig Start Date End Date Taking? Authorizing Provider   prenatal vit-iron fumarate-fa (RIGHT STEP PRENATAL VITAMINS) 27 mg iron- 0.8 mg tab tablet Take 1 Tab by mouth daily. Yes Provider, Historical   ibuprofen (MOTRIN) 600 mg tablet Take 1 Tab by mouth every six (6) hours as needed. 18   Carly Gomez MD   oxyCODONE-acetaminophen (PERCOCET) 5-325 mg per tablet Take 1 Tab by mouth every six (6) hours as needed. Max Daily Amount: 4 Tabs. 18   Carly Gomez MD   ferrous sulfate (IRON) 325 mg (65 mg iron) tablet Take 325 mg by mouth Daily (before breakfast). Provider, Historical        Review of Systems:  A comprehensive review of systems was negative except for that written in the History of Present Illness. Objective:     Vitals:    20 1149   BP: 152/90   Pulse: 72   Resp: 14   Temp: 98.3 °F (36.8 °C)   SpO2: 99%   Weight: 69.1 kg (152 lb 4.8 oz)   Height: 5' 3\" (1.6 m)       Physical Exam:  Patient without distress. Heart: Regular rate and rhythm  Lung: clear to auscultation throughout lung fields, no wheezes, no rales, no rhonchi and normal respiratory effort  Back: costovertebral angle tenderness absent  Abdomen: soft, nontender  Pelvic: Deferred    Assessment:     Principal Problem:    Missed  (2020)    Active Problems:    Recurrent pregnancy loss (2020)        Plan:     Procedure(s) (LRB):  DILATATION AND CURETTAGE WITH SUCTION/ 8W/ A+ (N/A) with plan for genetic microarrary testing of POCs.      Discussed the risks of surgery including the risks of bleeding, infection, deep vein thrombosis, and surgical injuries to internal organs including but not limited to the bowels, bladder, rectum, and female reproductive organs. Risk of uterine perforation and scar tissue with in the uterus. The patient understands the risks; any and all questions were answered to the patient's satisfaction. Patient would like to proceed with surgery as planned.     Vika Ackerman MD

## 2020-05-28 NOTE — PROGRESS NOTES
Support given to patient in 8 week fetal demise. \"[employer]\" early pregnancy loss brochure and Kari Will support group information given along with contact information. Fili Saleh M.Div.

## 2020-05-28 NOTE — DISCHARGE INSTRUCTIONS
After general anesthesia or intravenous sedation, for 24 hours or while taking prescription Narcotics:  · Limit your activities  · A responsible adult needs to be with you for the next 24 hours  · Do not drive and operate hazardous machinery  · Do not make important personal or business decisions  · Do not drink alcoholic beverages  · If you have not urinated within 8 hours after discharge, please contact your surgeon on call. · If you have sleep apnea and have a CPAP machine, please use it for all naps and sleeping. · Please use caution when taking narcotics and any of your home medications that may cause drowsiness. *  Please give a list of your current medications to your Primary Care Provider. *  Please update this list whenever your medications are discontinued, doses are      changed, or new medications (including over-the-counter products) are added. *  Please carry medication information at all times in case of emergency situations. Patient Education        Dilation and Curettage: What to Expect at Home  Your Recovery  Dilation and curettage (D&C) is a procedure to remove a miscarriage or tissue from the inside of the uterus. The doctor used a curved tool, called a curette, to gently remove tissue from your uterus. You are likely to have a backache, or cramps similar to menstrual cramps, and pass small clots of blood from your vagina for the first few days. You may continue to have light vaginal bleeding for several weeks after the procedure. You will probably be able to go back to most of your normal activities in 1 or 2 days. This care sheet gives you a general idea about how long it will take for you to recover. But each person recovers at a different pace. Follow the steps below to get better as quickly as possible. How can you care for yourself at home? Activity  · Rest when you feel tired. Getting enough sleep will help you recover.   · Avoid strenuous activities, such as bicycle riding, jogging, weight lifting, or aerobic exercise, until your doctor says it is okay. · Most women are able to return to work the day after the procedure. · You may have some light vaginal bleeding. Wear sanitary pads if needed. Do not douche or use tampons for 2 weeks or until your doctor says it is okay. · Ask your doctor when it is okay for you to have sex. · If you could become pregnant, talk about birth control with your doctor. Do not try to become pregnant until your doctor says it is okay. Diet  · You can eat your normal diet. If your stomach is upset, try bland, low-fat foods like plain rice, broiled chicken, toast, and yogurt. · Drink plenty of fluids (unless your doctor tells you not to). Medicines  · Your doctor will tell you if and when you can restart your medicines. He or she will also give you instructions about taking any new medicines. · If you take aspirin or some other blood thinner, ask your doctor if and when to start taking it again. Make sure that you understand exactly what your doctor wants you to do. · Be safe with medicines. Take pain medicines exactly as directed. ? If the doctor gave you a prescription medicine for pain, take it as prescribed. ? If you are not taking a prescription pain medicine, ask your doctor if you can take an over-the-counter medicine. · If you think your pain medicine is making you sick to your stomach:  ? Take your medicine after meals (unless your doctor has told you not to). ? Ask your doctor for a different pain medicine. · If your doctor prescribed antibiotics, take them as directed. Do not stop taking them just because you feel better. You need to take the full course of antibiotics. Follow-up care is a key part of your treatment and safety. Be sure to make and go to all appointments, and call your doctor if you are having problems. It's also a good idea to know your test results and keep a list of the medicines you take.   When should you call for help?   [id number] anytime you think you may need emergency care. For example, call if:  · You passed out (lost consciousness). · You have chest pain, are short of breath, or cough up blood. Call your doctor now or seek immediate medical care if:  · You have bright red vaginal bleeding that soaks one or more pads in an hour, or you have large clots. · You have vaginal discharge that increases in amount or smells bad. · You are sick to your stomach or cannot drink fluids. · You have pain that does not get better after you take pain medicine. · You cannot pass stools or gas. · You have symptoms of a blood clot in your leg (called a deep vein thrombosis), such as:  ? Pain in your calf, back of the knee, thigh, or groin. ? Redness and swelling in your leg. · You have signs of infection, such as:  ? Increased pain, swelling, warmth, or redness. ? Red streaks leading from the area. ? Pus draining from the area. ? A fever. Watch closely for changes in your health, and be sure to contact your doctor if you have any problems. Where can you learn more? Go to http://nikolas-izabela.info/  Enter D453 in the search box to learn more about \"Dilation and Curettage: What to Expect at Home. \"  Current as of: February 11, 2020               Content Version: 12.5  © 4165-9722 [employer]. Care instructions adapted under license by [email or web address] (which disclaims liability or warranty for this information). If you have questions about a medical condition or this instruction, always ask your healthcare professional. [place] [address] any warranty or liability for your use of this information. These are general instructions for a healthy lifestyle:  No smoking/ No tobacco products/ Avoid exposure to second hand smoke  Surgeon General's Warning:  Quitting smoking now greatly reduces serious risk to your health.   Obesity, smoking, and sedentary lifestyle greatly increases your risk for illness  A healthy diet, regular physical exercise & weight monitoring are important for maintaining a healthy lifestyle    You may be retaining fluid if you have a history of heart failure or if you experience any of the following symptoms:  Weight gain of 3 pounds or more overnight or 5 pounds in a week, increased swelling in our hands or feet or shortness of breath while lying flat in bed. Please call your doctor as soon as you notice any of these symptoms; do not wait until your next office visit.

## 2020-05-28 NOTE — OP NOTES
GYN Operative Report    Patient: Dena Lomas MRN: [id number]  SSN: [id number]    YOB: 1985  Age: 29 y.o. Sex: female       Date of Surgery:  2020     Preoperative Diagnosis: Missed  [O02.1], Recurrent pregnancy loss    Postoperative Diagnosis: Missed  [O02.1], Recurrent pregnancy loss    Surgeon(s) and Role:     * Tami Arcos MD - Primary    Anesthesia: General    Procedure: DILATATION AND CURETTAGE WITH SUCTION     Estimated Blood Loss:  Minimal, <50cc    Drains: none    Implants:  * No implants in log *    Specimen:    ID Type Source Tests Collected by Time Destination   1 : Products of conception. Special Studies (Specify) Products of Conception  Tmai Arcos MD 2020 1331 Genetic Testing       Findings:  Uterus sounded 10cm, moderate amount of POCs obtained with suction D&C    Procedure: Patient was taken to the operating room where general anesthesia was obtained without difficulty. She was placed in a dorsal lithotomy position with candy-cane stirrups. She was prepped and draped in the usual sterile fashion. Doxycycline 100 mg IV was given in pre-op. A time out was performed to ensure it was the correct patient, correct procedure, and to identify all allergies. A weighted speculum was placed in the vagina and the cervix identified. A tenaculum was placed on the anterior lip of the cervix. The uterus was sounded to 10cm. Carson dilators were used to dilate to 21 [place]. A 8mm suction was then introduced into the uterine cavity to the fundus and suction applied to evacuate POCs. A gentle sharp curettage was then performed with smooth curette and good uterine cri noted 360 degrees around the uterine cavity. Suction curettage once again performed. Scant bleeding noted. Tenaculum was removed and sites were hemostatic. All instruments removed from the vagina.  POCs were cleaned and sent for genetic testing as per patient's request in setting of recurrent pregnancy loss. Disposition: Stable to PACU. Plan discharge home.      Alice Whitehead MD

## 2020-05-28 NOTE — ANESTHESIA POSTPROCEDURE EVALUATION
Procedure(s):  DILATATION AND CURETTAGE WITH SUCTION/ 8W/ A+.     general    Anesthesia Post Evaluation      Multimodal analgesia: multimodal analgesia used between 6 hours prior to anesthesia start to PACU discharge  Patient location during evaluation: bedside  Patient participation: complete - patient participated  Level of consciousness: awake  Pain management: adequate  Airway patency: patent  Anesthetic complications: no  Cardiovascular status: acceptable and stable  Respiratory status: acceptable and room air  Hydration status: acceptable  Post anesthesia nausea and vomiting:  none      INITIAL Post-op Vital signs:   Vitals Value Taken Time   /80 5/28/2020  1:46 PM   Temp 36.7 °C (98.1 °F) 5/28/2020  1:46 PM   Pulse 80 5/28/2020  1:46 PM   Resp 16 5/28/2020  1:46 PM   SpO2 97 % 5/28/2020  1:46 PM

## 2020-05-28 NOTE — ANESTHESIA PREPROCEDURE EVALUATION
Relevant Problems   No relevant active problems       Anesthetic History          Comments: Slow to wake up     Review of Systems / Medical History  Patient summary reviewed and pertinent labs reviewed    Pulmonary  Within defined limits                 Neuro/Psych   Within defined limits           Cardiovascular  Within defined limits                Exercise tolerance: >4 METS     GI/Hepatic/Renal  Within defined limits              Endo/Other  Within defined limits           Other Findings   Comments: D&C 2/2 missed             Physical Exam    Airway  Mallampati: II  TM Distance: > 6 cm  Neck ROM: normal range of motion   Mouth opening: Normal     Cardiovascular  Regular rate and rhythm,  S1 and S2 normal,  no murmur, click, rub, or gallop  Rhythm: regular  Rate: normal         Dental  No notable dental hx       Pulmonary  Breath sounds clear to auscultation               Abdominal  GI exam deferred       Other Findings            Anesthetic Plan    ASA: 1  Anesthesia type: general          Induction: Intravenous  Anesthetic plan and risks discussed with: Patient

## 2021-08-31 PROBLEM — O09.292 HISTORY OF GESTATIONAL DIABETES IN PRIOR PREGNANCY, CURRENTLY PREGNANT IN SECOND TRIMESTER: Status: ACTIVE | Noted: 2018-09-03

## 2021-08-31 PROBLEM — Z86.32 HISTORY OF GESTATIONAL DIABETES IN PRIOR PREGNANCY, CURRENTLY PREGNANT IN SECOND TRIMESTER: Status: ACTIVE | Noted: 2018-09-03

## 2021-08-31 PROBLEM — O02.1 MISSED ABORTION: Status: RESOLVED | Noted: 2020-05-28 | Resolved: 2021-08-31

## 2021-08-31 PROBLEM — Z3A.19 19 WEEKS GESTATION OF PREGNANCY: Status: ACTIVE | Noted: 2021-08-31

## 2021-08-31 PROBLEM — O09.522 HIGH RISK PREGNANCY, MULTIGRAVIDA OF ADVANCED MATERNAL AGE IN SECOND TRIMESTER: Status: ACTIVE | Noted: 2021-08-31

## 2021-08-31 PROBLEM — Z37.9 NORMAL LABOR: Status: RESOLVED | Noted: 2018-09-02 | Resolved: 2021-08-31

## 2021-08-31 PROBLEM — O26.20 PREGNANCY WITH HISTORY OF MULTIPLE PREGNANCY LOSS: Status: ACTIVE | Noted: 2020-05-28

## 2021-09-02 PROBLEM — O09.812 PREGNANCY RESULTING FROM ASSISTED REPRODUCTIVE TECHNOLOGY IN SECOND TRIMESTER: Status: ACTIVE | Noted: 2021-09-02

## 2021-09-02 PROBLEM — Z92.29 COVID-19 VACCINE SERIES COMPLETED: Status: ACTIVE | Noted: 2021-09-02

## 2021-09-02 PROBLEM — O10.919 CHRONIC HYPERTENSION AFFECTING PREGNANCY: Status: ACTIVE | Noted: 2018-09-03

## 2021-12-16 ENCOUNTER — TRANSCRIBE ORDER (OUTPATIENT)
Dept: SCHEDULING | Age: 36
End: 2021-12-16

## 2021-12-16 ENCOUNTER — HOSPITAL ENCOUNTER (OUTPATIENT)
Dept: ULTRASOUND IMAGING | Age: 36
Discharge: HOME | End: 2021-12-16
Attending: OBSTETRICS & GYNECOLOGY

## 2021-12-16 DIAGNOSIS — M79.89 LEFT LEG SWELLING: ICD-10-CM

## 2021-12-16 DIAGNOSIS — M79.89 LEFT LEG SWELLING: Primary | ICD-10-CM

## 2021-12-28 NOTE — PROGRESS NOTES
Problem: Musculor/Skeletal Functional Status  Goal: Absence of falls  Outcome: Met This Shift  Note: Patient verbalizes understanding of fall precautions. Patient free from falls this visit. Intervention: Fall precautions  Note: Discussed fall precautions, call light within reach. Problem: Intellectual/Education/Knowledge Deficit  Goal: Teaching initiated upon admission  Outcome: Met This Shift  Note: Patient verbalizes understanding to verbal information given on xgeva,action and possible side effects. Aware to call MD if develop complications. Intervention: Verbal/written education provided  Note: Discussed xgeva action, possible side effects and when to call the doctor. Problem: Discharge Planning  Goal: Knowledge of discharge instructions  Description: Knowledge of discharge instructions  Outcome: Met This Shift  Note: Verbalized understanding of discharge instructions, follow-up appointments, and when to call the physician. Intervention: Discharge to appropriate level of care  Note: Discuss discharge instructions, follow ups, and when to call the doctor. Care plan reviewed with patient and family. Patient and family verbalizes understanding of the plan of care and contribute to goal setting. Report received from Citlaly Krueger RN. Assumed patient care. Bedside report completed.

## 2022-01-09 ENCOUNTER — HOSPITAL ENCOUNTER (OUTPATIENT)
Age: 37
Discharge: HOME | End: 2022-01-10
Attending: OBSTETRICS & GYNECOLOGY | Admitting: OBSTETRICS & GYNECOLOGY
Payer: COMMERCIAL

## 2022-01-09 PROBLEM — O47.1 FALSE LABOR AFTER 37 WEEKS OF GESTATION WITHOUT DELIVERY: Status: ACTIVE | Noted: 2022-01-09

## 2022-01-09 PROBLEM — M54.9 BACK PAIN AFFECTING PREGNANCY IN THIRD TRIMESTER: Status: ACTIVE | Noted: 2022-01-09

## 2022-01-09 PROBLEM — U07.1 COVID-19 AFFECTING PREGNANCY IN THIRD TRIMESTER: Status: ACTIVE | Noted: 2022-01-09

## 2022-01-09 PROBLEM — O98.513 COVID-19 AFFECTING PREGNANCY IN THIRD TRIMESTER: Status: ACTIVE | Noted: 2022-01-09

## 2022-01-09 PROBLEM — O99.891 BACK PAIN AFFECTING PREGNANCY IN THIRD TRIMESTER: Status: ACTIVE | Noted: 2022-01-09

## 2022-01-09 LAB
COVID-19 RAPID TEST, COVR: DETECTED
SOURCE, COVRS: ABNORMAL

## 2022-01-09 PROCEDURE — 74011250637 HC RX REV CODE- 250/637: Performed by: OBSTETRICS & GYNECOLOGY

## 2022-01-09 PROCEDURE — 87635 SARS-COV-2 COVID-19 AMP PRB: CPT

## 2022-01-09 PROCEDURE — 99283 EMERGENCY DEPT VISIT LOW MDM: CPT

## 2022-01-09 RX ORDER — ACETAMINOPHEN 500 MG
1000 TABLET ORAL ONCE
Status: COMPLETED | OUTPATIENT
Start: 2022-01-09 | End: 2022-01-09

## 2022-01-09 RX ORDER — HYDROXYZINE PAMOATE 25 MG/1
25 CAPSULE ORAL ONCE
Status: COMPLETED | OUTPATIENT
Start: 2022-01-10 | End: 2022-01-10

## 2022-01-09 RX ADMIN — ACETAMINOPHEN 1000 MG: 500 TABLET, FILM COATED ORAL at 22:45

## 2022-01-10 VITALS
SYSTOLIC BLOOD PRESSURE: 118 MMHG | OXYGEN SATURATION: 95 % | TEMPERATURE: 100.6 F | DIASTOLIC BLOOD PRESSURE: 77 MMHG | BODY MASS INDEX: 27.99 KG/M2 | HEART RATE: 121 BPM | HEIGHT: 63 IN | RESPIRATION RATE: 18 BRPM

## 2022-01-10 PROCEDURE — 74011250636 HC RX REV CODE- 250/636: Performed by: OBSTETRICS & GYNECOLOGY

## 2022-01-10 PROCEDURE — 74011250637 HC RX REV CODE- 250/637: Performed by: OBSTETRICS & GYNECOLOGY

## 2022-01-10 RX ADMIN — HYDROXYZINE PAMOATE 25 MG: 25 CAPSULE ORAL at 00:09

## 2022-01-10 RX ADMIN — SODIUM CHLORIDE, SODIUM LACTATE, POTASSIUM CHLORIDE, AND CALCIUM CHLORIDE 1000 ML: 600; 310; 30; 20 INJECTION, SOLUTION INTRAVENOUS at 00:00

## 2022-01-10 NOTE — PROGRESS NOTES
Pt presents to [facility] with c/o ctx. Pt states she started to feel sick today and took a rapid covid home test that was negative today with PCR pending. Pt states temp at home 99.1 and she has had elevated pulse and HA. Rapid covid swab collected and sent to lab.

## 2022-01-10 NOTE — H&P
[facility] History & Physical    Name: Alena Dvei MRN: [id number]  SSN: [id number]    YOB: 1985  Age: 39 y.o. Sex: female      Subjective:     Chief Complaint:  Pregnancy and muscle aches/fever, contractions    History of Present Illness: Ms. Lakisha Agrawal is a 39 y.o.  female with an estimated gestational age of 36w4d with Estimated Date of Delivery: 22. Patient complains of muscle aches and fever for 1 days. Pregnancy has been complicated by advanced maternal age, chronic hypertension and hx of GDM, history of recurrent pregnancy loss. Patient denies chest pain, headache , nausea and vomiting, pelvic pressure, shortness of breath, swelling, vaginal bleeding , vaginal leaking of fluid , visual disturbances and dysuria. She denies any cough. She is vaccinated for COVID-19. She has noted contractions that have been irregular for the past several hours. Pregnancy is the product of IUI, patient received care at [place] [address]. P[facility] is with [facility].     OB History    Para Term  AB Living   6 1 1 0 4 1   SAB IAB Ectopic Molar Multiple Live Births   4 0 0     1      # Outcome Date GA Lbr Ajit/2nd Weight Sex Delivery Anes PTL Lv   6 Current            5 Term 18 39w1d / 03:05 3.61 kg M Vag-Vacuum EPIDURAL AN N LIU   4 SAB 10/2017           3 SAB            2 SAB            1 SAB              Past Medical History:   Diagnosis Date    COVID-19 affecting pregnancy in third trimester 2022    Gestational diabetes     H/O seasonal allergies     HTN (hypertension)     controlled without medication    Missed  2020    Normal labor 2018    Proteinuria     work up with nephrology was negative    Pseudoangiomatous stromal hyperplasia of breast     followed by Kinjal De Dios [facility]    Seizure [facility])     as a child, 1 isolated     Past Surgical History:   Procedure Laterality Date    HX BREAST LUMPECTOMY      HX WISDOM TEETH EXTRACTION       Social History     Occupational History    Occupation: physical therapist   Tobacco Use    Smoking status: Never Smoker    Smokeless tobacco: Never Used   Substance and Sexual Activity    Alcohol use: No     Alcohol/week: 0.0 standard drinks    Drug use: No    Sexual activity: Yes     Partners: Male      Family History   Problem Relation Age of Onset    Hypertension Father     Cancer Father         prostate    Hypertension Mother     Thyroid Disease Other     Heart Disease Paternal Grandfather     Stroke Paternal Grandfather     Heart Disease Maternal Grandfather     Stroke Paternal Grandmother     Breast Cancer Paternal Aunt         4 aunts       Allergies   Allergen Reactions    Nickel Other (comments)     Prior to Admission medications    Medication Sig Start Date End Date Taking? Authorizing Provider   cholecalciferol, vitamin D3, (VITAMIN D3 PO) Take  by mouth. Yes Provider, Historical   prenatal vit-iron fumarate-fa (RIGHT STEP PRENATAL VITAMINS) 27 mg iron- 0.8 mg tab tablet Take 1 Tab by mouth daily. Yes Provider, Historical   aspirin delayed-release 81 mg tablet Take  by mouth daily. Patient not taking: Reported on 2022    Provider, Historical   acetaminophen (TylenoL) 325 mg tablet Take 325 mg by mouth every four (4) hours as needed for Pain. Provider, Historical        Review of Systems:  Review of Systems   Constitutional: Fever present  HENT: Negative. Eyes: Negative. Respiratory: Negative. Cardiovascular: Negative. Gastrointestinal: negative  Genitourinary: Negative. Musculoskeletal: Muscle aches present  Skin: Negative. Neurological: Negative. Endo/Heme/Allergies: Negative. Psychiatric/Behavioral: Negative.        Objective:     Vitals:    Vitals:    22 2311 22 2316 22 2323 22 232   BP:       Pulse:       Resp:       Temp:       SpO2: 95% 97% 94% 94%   Height:          Temp (24hrs), Av.6 °F (38.1 °C), Min:100.6 °F (38.1 °C), Max:100.6 °F (38.1 °C)    BP  Min: 118/77  Max: 131/72     Physical Exam:  Constitutional: The patient appears well, alert, oriented x 3. Cardiovascular: Heart RRR, no murmurs. Respiratory: Lungs clear, no respiratory distress  GI: Abdomen soft, nontender, no guarding  No fundal tenderness  Musculoskeletal: no cva tenderness  Upper ext: no edema, reflexes +2  Lower ext: no edema, neg guilherme's, reflexes +2  Skin: no rashes or lesions  Psychiatric:Mood/ Affect: appropriate  Genitourinary: SVE: 2/60/-3-->unchanged over 1.5 hours  Membranes:  Intact  Uterine Activity:  Frequency: irregular  Fetal Heart Rate:  Reactive  Baseline: 155/160 beats per minute  Variability: moderate  Accelerations: yes  Decelerations: none       Lab/Data Review:  Recent Results (from the past 24 hour(s))   COVID-19 RAPID TEST    Collection Time: 01/09/22 10:06 PM   Result Value Ref Range    Specimen source NASAL SWAB      COVID-19 rapid test Detected (AA) NOTD         Assessment and Plan: Active Problems:    COVID-19 affecting pregnancy in third trimester (1/9/2022)      Back pain affecting pregnancy in third trimester (1/9/2022)      False labor after 37 weeks of gestation without delivery (1/9/2022)       IVF rehydration. Tylenol for fever/muscle aches. Reviewed symptomatic treatment for COVID 19 and isolation precautions. She is not in labor. FWB overall reassuring - category 1 tracing. Will plan close follow up by phone with primary OB. D/c to home. Plan of care reviewed with Dr. Nitish [facility] who is in agreement. I have personally reviewed the patient's history, prenatal record, and pertinent test results, care everywhere, vital sign trends, radiology reports, laboratory results, previous provider notes support my clinical impression.

## 2022-01-10 NOTE — DISCHARGE INSTRUCTIONS
Patient Education   Please follow up with primary OB doctor at next apt. Return to [facility] if experiencing contractions that become stronger and closer together, leaking of fluid, vaginal bleeding, or decreased fetal movement. Pregnancy Precautions: Care Instructions  Your Care Instructions     There is no sure way to prevent labor before your due date ( labor) or to prevent most other pregnancy problems. But there are things you can do to increase your chances of a healthy pregnancy. Go to your appointments, follow your doctor's advice, and take good care of yourself. Eat well, and exercise (if your doctor agrees). And make sure to drink plenty of water. Follow-up care is a key part of your treatment and safety. Be sure to make and go to all appointments, and call your doctor if you are having problems. It's also a good idea to know your test results and keep a list of the medicines you take. How can you care for yourself at home? · Make sure you go to your prenatal appointments. At each visit, your doctor will check your blood pressure. Your doctor will also check to see if you have protein in your urine. High blood pressure and protein in urine are signs of preeclampsia. This condition can be dangerous for you and your baby. · Drink plenty of fluids. Dehydration can cause contractions. If you have kidney, heart, or liver disease and have to limit fluids, talk with your doctor before you increase the amount of fluids you drink. · Tell your doctor right away if you notice any symptoms of an infection, such as:  ? Burning when you urinate. ? A foul-smelling discharge from your vagina. ? Vaginal itching. ? Unexplained fever. ? Unusual pain or soreness in your uterus or lower belly. · Eat a balanced diet. Include plenty of foods that are high in calcium and iron. ? Foods high in calcium include milk, cheese, yogurt, almonds, and broccoli. ?  Foods high in iron include red meat, shellfish, poultry, eggs, beans, raisins, whole-grain bread, and leafy green vegetables. · Do not smoke. If you need help quitting, talk to your doctor about stop-smoking programs and medicines. These can increase your chances of quitting for good. · Do not drink alcohol or use marijuana or illegal drugs. · Follow your doctor's directions about activity. Your doctor will let you know how much, if any, exercise you can do. · Ask your doctor if you can have sex. If you are at risk for early labor, your doctor may ask you to not have sex. · Take care to prevent falls. During pregnancy, your joints are loose, and your balance is off. Sports such as bicycling, skiing, or in-line skating can increase your risk of falling. And don't ride horses or motorcycles, dive, water ski, scuba dive, or parachute jump while you are pregnant. · Avoid getting very hot. Do not use saunas or hot tubs. Avoid staying out in the sun in hot weather for long periods. Take acetaminophen (Tylenol) to lower a high fever. · Do not take any over-the-counter or herbal medicines or supplements without talking to your doctor or pharmacist first.  When should you call for help? Call 911  anytime you think you may need emergency care. For example, call if:    · You passed out (lost consciousness).     · You have a seizure.     · You have severe vaginal bleeding.     · You have severe pain in your belly or pelvis.     · You have had fluid gushing or leaking from your vagina and you know or think the umbilical cord is bulging into your vagina. If this happens, immediately get down on your knees so your rear end (buttocks) is higher than your head. This will decrease the pressure on the cord until help arrives. Call your doctor now or seek immediate medical care if:    · You have signs of preeclampsia, such as:  ? Sudden swelling of your face, hands, or feet. ? New vision problems (such as dimness, blurring, or seeing spots).   ? A severe headache.     · You have any vaginal bleeding.     · You have belly pain or cramping.     · You have a fever.     · You have had regular contractions (with or without pain) for an hour. This means that you have 8 or more within 1 hour or 4 or more in 20 minutes after you change your position and drink fluids.     · You have a sudden release of fluid from your vagina.     · You have low back pain or pelvic pressure that does not go away.     · You notice that your baby has stopped moving or is moving much less than normal.   Watch closely for changes in your health, and be sure to contact your doctor if you have any problems. Where can you learn more? Go to http://www.han.com/  Enter Y951 in the search box to learn more about \"Pregnancy Precautions: Care Instructions. \"  Current as of: June 16, 2021               Content Version: 13.0  © 0890-4143 [employer], [employer]. Care instructions adapted under license by [employer] (which disclaims liability or warranty for this information). If you have questions about a medical condition or this instruction, always ask your healthcare professional. [address] any warranty or liability for your use of this information.

## 2022-01-14 ENCOUNTER — ANESTHESIA (OUTPATIENT)
Dept: ANTEPARTUM | Age: 37
End: 2022-01-14
Payer: COMMERCIAL

## 2022-01-14 ENCOUNTER — ANESTHESIA EVENT (OUTPATIENT)
Dept: ANTEPARTUM | Age: 37
End: 2022-01-14
Payer: COMMERCIAL

## 2022-01-14 ENCOUNTER — HOSPITAL ENCOUNTER (INPATIENT)
Age: 37
LOS: 3 days | Discharge: HOME | End: 2022-01-17
Attending: OBSTETRICS & GYNECOLOGY | Admitting: OBSTETRICS & GYNECOLOGY
Payer: COMMERCIAL

## 2022-01-14 PROBLEM — Z3A.39 39 WEEKS GESTATION OF PREGNANCY: Status: ACTIVE | Noted: 2022-01-14

## 2022-01-14 PROBLEM — Z86.16 PERSONAL HISTORY OF COVID-19: Status: ACTIVE | Noted: 2022-01-14

## 2022-01-14 PROBLEM — Z34.90 ENCOUNTER FOR INDUCTION OF LABOR: Status: ACTIVE | Noted: 2022-01-14

## 2022-01-14 PROBLEM — O24.419 GESTATIONAL DIABETES MELLITUS (GDM) AFFECTING PREGNANCY: Status: ACTIVE | Noted: 2022-01-14

## 2022-01-14 LAB
ABO + RH BLD: NORMAL
ALBUMIN SERPL-MCNC: 2.4 G/DL (ref 3.5–5)
ALBUMIN/GLOB SERPL: 0.6 {RATIO} (ref 1.2–3.5)
ALP SERPL-CCNC: 240 U/L (ref 50–136)
ALT SERPL-CCNC: 44 U/L (ref 12–65)
ANION GAP SERPL CALC-SCNC: 8 MMOL/L (ref 7–16)
AST SERPL-CCNC: 55 U/L (ref 15–37)
BASE DEFICIT BLD-SCNC: 2.7 MMOL/L
BASE DEFICIT BLD-SCNC: 3.8 MMOL/L
BILIRUB SERPL-MCNC: 0.4 MG/DL (ref 0.2–1.1)
BLOOD GROUP ANTIBODIES SERPL: NORMAL
BUN SERPL-MCNC: 11 MG/DL (ref 6–23)
CALCIUM SERPL-MCNC: 9 MG/DL (ref 8.3–10.4)
CHLORIDE SERPL-SCNC: 104 MMOL/L (ref 98–107)
CO2 SERPL-SCNC: 23 MMOL/L (ref 21–32)
CREAT SERPL-MCNC: 0.46 MG/DL (ref 0.6–1)
ERYTHROCYTE [DISTWIDTH] IN BLOOD BY AUTOMATED COUNT: 13.8 % (ref 11.9–14.6)
GLOBULIN SER CALC-MCNC: 4.3 G/DL (ref 2.3–3.5)
GLUCOSE BLD STRIP.AUTO-MCNC: 100 MG/DL (ref 65–100)
GLUCOSE BLD STRIP.AUTO-MCNC: 94 MG/DL (ref 65–100)
GLUCOSE BLD STRIP.AUTO-MCNC: 96 MG/DL (ref 65–100)
GLUCOSE SERPL-MCNC: 89 MG/DL (ref 65–100)
HCO3 BLD-SCNC: 26.6 MMOL/L (ref 22–26)
HCO3 BLDV-SCNC: 22.4 MMOL/L (ref 23–28)
HCT VFR BLD AUTO: 34.6 % (ref 35.8–46.3)
HGB BLD-MCNC: 11.5 G/DL (ref 11.7–15.4)
MCH RBC QN AUTO: 30.2 PG (ref 26.1–32.9)
MCHC RBC AUTO-ENTMCNC: 33.2 G/DL (ref 31.4–35)
MCV RBC AUTO: 90.8 FL (ref 79.6–97.8)
NRBC # BLD: 0 K/UL (ref 0–0.2)
PCO2 BLDCO: 39 MMHG (ref 32–68)
PCO2 BLDCO: 70 MMHG (ref 32–68)
PH BLDCO: 7.19 [PH] (ref 7.15–7.38)
PH BLDCO: 7.37 [PH] (ref 7.15–7.38)
PLATELET # BLD AUTO: 159 K/UL (ref 150–450)
PMV BLD AUTO: 12.6 FL (ref 9.4–12.3)
PO2 BLDCO: 17 MMHG
PO2 BLDCO: 40 MMHG
POTASSIUM SERPL-SCNC: 4.1 MMOL/L (ref 3.5–5.1)
PROT SERPL-MCNC: 6.7 G/DL (ref 6.3–8.2)
RBC # BLD AUTO: 3.81 M/UL (ref 4.05–5.2)
SAO2 % BLD: 16.6 % (ref 95–98)
SAO2 % BLDV: 73.5 % (ref 65–88)
SERVICE CMNT-IMP: ABNORMAL
SERVICE CMNT-IMP: ABNORMAL
SERVICE CMNT-IMP: NORMAL
SODIUM SERPL-SCNC: 135 MMOL/L (ref 136–145)
SPECIMEN EXP DATE BLD: NORMAL
SPECIMEN TYPE: ABNORMAL
SPECIMEN TYPE: ABNORMAL
WBC # BLD AUTO: 5.4 K/UL (ref 4.3–11.1)

## 2022-01-14 PROCEDURE — 76060000078 HC EPIDURAL ANESTHESIA

## 2022-01-14 PROCEDURE — 74011250636 HC RX REV CODE- 250/636: Performed by: OBSTETRICS & GYNECOLOGY

## 2022-01-14 PROCEDURE — 82962 GLUCOSE BLOOD TEST: CPT

## 2022-01-14 PROCEDURE — 75410000002 HC LABOR FEE PER 1 HR

## 2022-01-14 PROCEDURE — 80053 COMPREHEN METABOLIC PANEL: CPT

## 2022-01-14 PROCEDURE — 85027 COMPLETE CBC AUTOMATED: CPT

## 2022-01-14 PROCEDURE — 00HU33Z INSERTION OF INFUSION DEVICE INTO SPINAL CANAL, PERCUTANEOUS APPROACH: ICD-10-PCS | Performed by: ANESTHESIOLOGY

## 2022-01-14 PROCEDURE — 77030003028 HC SUT VCRL J&J -A

## 2022-01-14 PROCEDURE — 65270000029 HC RM PRIVATE

## 2022-01-14 PROCEDURE — 75410000000 HC DELIVERY VAGINAL/SINGLE

## 2022-01-14 PROCEDURE — 75410000003 HC RECOV DEL/VAG/CSECN EA 0.5 HR

## 2022-01-14 PROCEDURE — 74011250636 HC RX REV CODE- 250/636: Performed by: NURSE ANESTHETIST, CERTIFIED REGISTERED

## 2022-01-14 PROCEDURE — A4300 CATH IMPL VASC ACCESS PORTAL: HCPCS | Performed by: ANESTHESIOLOGY

## 2022-01-14 PROCEDURE — 86900 BLOOD TYPING SEROLOGIC ABO: CPT

## 2022-01-14 PROCEDURE — 10907ZC DRAINAGE OF AMNIOTIC FLUID, THERAPEUTIC FROM PRODUCTS OF CONCEPTION, VIA NATURAL OR ARTIFICIAL OPENING: ICD-10-PCS | Performed by: OBSTETRICS & GYNECOLOGY

## 2022-01-14 PROCEDURE — 77030014125 HC TY EPDRL BBMI -B: Performed by: ANESTHESIOLOGY

## 2022-01-14 PROCEDURE — 82803 BLOOD GASES ANY COMBINATION: CPT

## 2022-01-14 PROCEDURE — 74011250637 HC RX REV CODE- 250/637: Performed by: OBSTETRICS & GYNECOLOGY

## 2022-01-14 RX ORDER — OXYTOCIN/RINGER'S LACTATE 30/500 ML
0-30 PLASTIC BAG, INJECTION (ML) INTRAVENOUS
Status: DISCONTINUED | OUTPATIENT
Start: 2022-01-14 | End: 2022-01-15

## 2022-01-14 RX ORDER — OXYTOCIN/RINGER'S LACTATE 30/500 ML
0-20 PLASTIC BAG, INJECTION (ML) INTRAVENOUS
Status: DISCONTINUED | OUTPATIENT
Start: 2022-01-14 | End: 2022-01-14

## 2022-01-14 RX ORDER — OXYTOCIN/RINGER'S LACTATE 30/500 ML
10 PLASTIC BAG, INJECTION (ML) INTRAVENOUS AS NEEDED
Status: COMPLETED | OUTPATIENT
Start: 2022-01-14 | End: 2022-01-14

## 2022-01-14 RX ORDER — LIDOCAINE HYDROCHLORIDE 20 MG/ML
JELLY TOPICAL
Status: DISCONTINUED | OUTPATIENT
Start: 2022-01-14 | End: 2022-01-15

## 2022-01-14 RX ORDER — FENTANYL CITRATE 50 UG/ML
INJECTION, SOLUTION INTRAMUSCULAR; INTRAVENOUS
Status: COMPLETED
Start: 2022-01-14 | End: 2022-01-14

## 2022-01-14 RX ORDER — SODIUM CHLORIDE 0.9 % (FLUSH) 0.9 %
5-40 SYRINGE (ML) INJECTION AS NEEDED
Status: DISCONTINUED | OUTPATIENT
Start: 2022-01-14 | End: 2022-01-15

## 2022-01-14 RX ORDER — LIDOCAINE HYDROCHLORIDE 10 MG/ML
1 INJECTION INFILTRATION; PERINEURAL
Status: DISCONTINUED | OUTPATIENT
Start: 2022-01-14 | End: 2022-01-15

## 2022-01-14 RX ORDER — FENTANYL CITRATE 50 UG/ML
INJECTION, SOLUTION INTRAMUSCULAR; INTRAVENOUS AS NEEDED
Status: DISCONTINUED | OUTPATIENT
Start: 2022-01-14 | End: 2022-01-14 | Stop reason: HOSPADM

## 2022-01-14 RX ORDER — MINERAL OIL
120 OIL (ML) ORAL
Status: DISCONTINUED | OUTPATIENT
Start: 2022-01-14 | End: 2022-01-15

## 2022-01-14 RX ORDER — BUTORPHANOL TARTRATE 2 MG/ML
1 INJECTION INTRAMUSCULAR; INTRAVENOUS
Status: DISCONTINUED | OUTPATIENT
Start: 2022-01-14 | End: 2022-01-15

## 2022-01-14 RX ORDER — ACETAMINOPHEN 500 MG
1000 TABLET ORAL ONCE
Status: COMPLETED | OUTPATIENT
Start: 2022-01-14 | End: 2022-01-14

## 2022-01-14 RX ORDER — DEXTROSE, SODIUM CHLORIDE, SODIUM LACTATE, POTASSIUM CHLORIDE, AND CALCIUM CHLORIDE 5; .6; .31; .03; .02 G/100ML; G/100ML; G/100ML; G/100ML; G/100ML
125 INJECTION, SOLUTION INTRAVENOUS CONTINUOUS
Status: DISCONTINUED | OUTPATIENT
Start: 2022-01-14 | End: 2022-01-15

## 2022-01-14 RX ORDER — OXYTOCIN/RINGER'S LACTATE 30/500 ML
87.3 PLASTIC BAG, INJECTION (ML) INTRAVENOUS AS NEEDED
Status: DISCONTINUED | OUTPATIENT
Start: 2022-01-14 | End: 2022-01-15

## 2022-01-14 RX ORDER — ROPIVACAINE HYDROCHLORIDE 2 MG/ML
INJECTION, SOLUTION EPIDURAL; INFILTRATION; PERINEURAL
Status: DISCONTINUED | OUTPATIENT
Start: 2022-01-14 | End: 2022-01-14 | Stop reason: HOSPADM

## 2022-01-14 RX ORDER — SODIUM CHLORIDE 0.9 % (FLUSH) 0.9 %
5-40 SYRINGE (ML) INJECTION EVERY 8 HOURS
Status: DISCONTINUED | OUTPATIENT
Start: 2022-01-14 | End: 2022-01-15

## 2022-01-14 RX ADMIN — FENTANYL CITRATE 100 MCG: 50 INJECTION INTRAMUSCULAR; INTRAVENOUS at 16:48

## 2022-01-14 RX ADMIN — ROPIVACAINE HYDROCHLORIDE 8 ML/HR: 2 INJECTION, SOLUTION EPIDURAL; INFILTRATION at 16:48

## 2022-01-14 RX ADMIN — Medication 1 MILLI-UNITS/MIN: at 14:36

## 2022-01-14 RX ADMIN — SODIUM CHLORIDE, SODIUM LACTATE, POTASSIUM CHLORIDE, CALCIUM CHLORIDE, AND DEXTROSE MONOHYDRATE 125 ML/HR: 600; 310; 30; 20; 5 INJECTION, SOLUTION INTRAVENOUS at 12:52

## 2022-01-14 RX ADMIN — ACETAMINOPHEN 1000 MG: 500 TABLET, FILM COATED ORAL at 21:34

## 2022-01-14 RX ADMIN — Medication 10000 MILLI-UNITS: at 23:28

## 2022-01-14 NOTE — ANESTHESIA PROCEDURE NOTES
Epidural Block    Patient location during procedure: OB  Start time: 1/14/2022 4:36 PM  End time: 1/14/2022 4:44 PM  Reason for block: labor epidural  Staffing  Performed: attending   Preanesthetic Checklist  Completed: patient identified, risks and benefits discussed, surgical consent, pre-op evaluation and timeout performed  Block Placement  Patient position: sitting  Prep: ChloraPrep  Sterility prep: cap, drape, gloves, hand and mask  Sedation level: no sedation  Patient monitoring: continuous pulse oximetry and heart rate  Approach: midline  Location: lumbar  Lumbar location: L3-L4  Epidural  Guidance: landmark technique  Needle  Needle type: Tuohy   Needle gauge: 17 G  Needle length: 10 cm  Needle insertion depth: 6 cm  Catheter type: end hole  Catheter size: 19 G  Catheter at skin depth: 11 cm  Catheter securement method: clear occlusive dressing, liquid medical adhesive and surgical tape  Test dose: negative  Assessment  Number of attempts: 1  Procedure assessment: patient tolerated procedure well with no immediate complications

## 2022-01-14 NOTE — H&P
History & Physical    Name: Toña Matson MRN: [id number]  SSN: [id number]    YOB: 1985  Age: 39 y.o. Sex: female      Subjective:     Estimated Date of Delivery: 22  OB History    Para Term  AB Living   6 1 1 0 4 1   SAB IAB Ectopic Molar Multiple Live Births   4 0 0     1      # Outcome Date GA Lbr Ajit/2nd Weight Sex Delivery Anes PTL Lv   6 Current            5 Term 18 39w1d / 03:05 3.61 kg M Vag-Vacuum EPIDURAL AN N LIU   4 SAB 10/2017           3 SAB            2 SAB            1 SAB                Ms. Neris Ceballos is admitted with pregnancy at 39w0d for induction of labor due to COVID-19 diagnosis in pregnancy and GDM, diet-controlled. Pregnancy also further complicated by recurrent pregnancy loss. She reports overall doing well. She reports occasional contractions. Endorses active fetal movement. Denies any LOF, VB, abnormal discharge. Please see prenatal records for details. On admission, blood pressure noted to be mildly elevated. She denies HA/visual disturbance, scotoma, RUQ pain.      Past Medical History:   Diagnosis Date    COVID-19 affecting pregnancy in third trimester 2022    Gestational diabetes     H/O seasonal allergies     HTN (hypertension)     controlled without medication    Missed  2020    Normal labor 2018    Proteinuria     work up with nephrology was negative    Pseudoangiomatous stromal hyperplasia of breast     followed by [facility] DALE[facility]    Seizure [facility])     as a child, 1 isolated     Past Surgical History:   Procedure Laterality Date    HX BREAST LUMPECTOMY      HX WISDOM TEETH EXTRACTION       Social History     Occupational History    Occupation: physical therapist   Tobacco Use    Smoking status: Never Smoker    Smokeless tobacco: Never Used   Substance and Sexual Activity    Alcohol use: No     Alcohol/week: 0.0 standard drinks    Drug use: No    Sexual activity: Yes     Partners: Male     Family History   Problem Relation Age of Onset    Hypertension Father     Cancer Father         prostate    Hypertension Mother     Thyroid Disease Other     Heart Disease Paternal Grandfather     Stroke Paternal Grandfather     Heart Disease Maternal Grandfather     Stroke Paternal Grandmother     Breast Cancer Paternal Aunt         4 aunts       Allergies   Allergen Reactions    Nickel Other (comments)     Prior to Admission medications    Medication Sig Start Date End Date Taking? Authorizing Provider   aspirin delayed-release 81 mg tablet Take  by mouth daily. Yes Provider, Historical   acetaminophen (TylenoL) 325 mg tablet Take 325 mg by mouth every four (4) hours as needed for Pain. Yes Provider, Historical   cholecalciferol, vitamin D3, (VITAMIN D3 PO) Take  by mouth. Yes Provider, Historical   prenatal vit-iron fumarate-fa (RIGHT STEP PRENATAL VITAMINS) 27 mg iron- 0.8 mg tab tablet Take 1 Tab by mouth daily. Yes Provider, Historical        Review of Systems: A comprehensive review of systems was negative except for that written in the History of Present Illness. Objective:     Vitals:  Vitals:    01/14/22 1245   Weight: 83 kg (183 lb)   Height: 5' 3\" (1.6 m)        Physical Exam:  Patient without distress. Heart: Regular rate and rhythm  Lung: clear to auscultation throughout lung fields and normal respiratory effort  Abdomen: soft, nontender  Fundus: soft and non tender  Perineum: blood absent, amniotic fluid absent  Cervical Exam: 3 cm dilated    70% effaced    -2 station    Lower Extremities:  - Edema 1+  Neurologic: 2+ DTR's  Membranes:  Artificial Rupture of Membranes;  Amniotic Fluid: medium amount of clear fluid  Fetal Heart Rate: Baseline: 130 per minute  Variability: moderate  Accelerations: yes  Decelerations: none          Prenatal Labs:   Lab Results   Component Value Date/Time    ABO/Rh(D) A POSITIVE 01/14/2022 12:35 PM       Impression/Plan:     Principal Problem:    Encounter for induction of labor (2022)    Active Problems:    Chronic hypertension affecting pregnancy (9/3/2018)      Overview: 21 /85 at last OB      2021 at [facility]: BP WNL today. Draw PreE labs if elevations continue. Personal history of COVID-19 (2022)      39 weeks gestation of pregnancy (2022)      Gestational diabetes mellitus (GDM) affecting pregnancy (2022)         Plan: Admit for induction of labor. Group B Strep negative. NICKOLAS when desires. Anticipate . Now s/p AROM. Elevated BP - Suspicious for GHTN vs. COVID-induced. CMP pending. Mild range at present. Denies signs/symptoms concerning for PreE. If elevates in to severe range, will treat appropriately. GDM, diet-controlled - POC glucose q2h during labor. Plan 2 hr GTT postpartum.

## 2022-01-14 NOTE — ANESTHESIA PREPROCEDURE EVALUATION
Relevant Problems   RESPIRATORY SYSTEM   (+) Personal history of COVID-19      CARDIOVASCULAR   (+) Chronic hypertension affecting pregnancy      ENDOCRINE   (+) Gestational diabetes mellitus (GDM) affecting pregnancy       Anesthetic History          Comments: Slow to wake up     Review of Systems / Medical History  Patient summary reviewed and pertinent labs reviewed    Pulmonary      Recent URI          Comments: COVID+   Neuro/Psych   Within defined limits           Cardiovascular  Within defined limits                Exercise tolerance: >4 METS     GI/Hepatic/Renal  Within defined limits              Endo/Other        Obesity     Other Findings            Physical Exam    Airway  Mallampati: II  TM Distance: > 6 cm  Neck ROM: normal range of motion   Mouth opening: Normal     Cardiovascular  Regular rate and rhythm,  S1 and S2 normal,  no murmur, click, rub, or gallop  Rhythm: regular  Rate: normal         Dental  No notable dental hx       Pulmonary  Breath sounds clear to auscultation               Abdominal  GI exam deferred       Other Findings            Anesthetic Plan    ASA: 3  Anesthesia type: epidural      Post-op pain plan if not by surgeon: epidural opioid      Anesthetic plan and risks discussed with: Patient and Spouse

## 2022-01-14 NOTE — PROGRESS NOTES
Labor Progress Note  Patient seen, fetal heart rate and contraction pattern evaluated, patient examined. Comfortable after epidural placement. Epidural recently turned down due to chest feeling heavy. Patient Vitals for the past 1 hrs:   BP Pulse   01/14/22 1828 139/88 68   01/14/22 1812 (!) 134/92 73   01/14/22 1807 (!) 133/90 75   01/14/22 1802 139/86 69   01/14/22 1758 136/84 65       Physical Exam:  Cervical Exam:  4 cm dilated    70% effaced    -1 station    Membranes:  Artificial Rupture of Membranes; Amniotic Fluid: medium amount of clear fluid  Uterine Activity: Frequency: Every 3 minutes  Fetal Heart Rate: Baseline: 130 per minute  Variability: moderate  Accelerations: yes  Decelerations: none    Assessment/Plan:  Reassuring fetal status. Comfortable after epidural placement. Continue with Pitocin augmentation.

## 2022-01-14 NOTE — PROGRESS NOTES
Mehdi Almanza at bedside at 0760. LUCIA Hmapton at bedside at 1633    Assisted pt to sitting up on bedside at 1633. Timeout completed at [phone] with MD, LUCIA and myself at bedside. Test dose given at [phone]. Negative reaction. Dose given at 1648. Pt assisted to lying back in left tilt position. See anesthesia record for details. See vital sign flow sheet for BP. Tolerated procedure well.

## 2022-01-15 LAB
ALBUMIN SERPL-MCNC: 1.9 G/DL (ref 3.5–5)
ALBUMIN/GLOB SERPL: 0.5 {RATIO} (ref 1.2–3.5)
ALP SERPL-CCNC: 185 U/L (ref 50–130)
ALT SERPL-CCNC: 49 U/L (ref 12–65)
ANION GAP SERPL CALC-SCNC: 4 MMOL/L (ref 7–16)
AST SERPL-CCNC: 62 U/L (ref 15–37)
BILIRUB SERPL-MCNC: 0.3 MG/DL (ref 0.2–1.1)
BUN SERPL-MCNC: 9 MG/DL (ref 6–23)
CALCIUM SERPL-MCNC: 8.9 MG/DL (ref 8.3–10.4)
CHLORIDE SERPL-SCNC: 109 MMOL/L (ref 98–107)
CO2 SERPL-SCNC: 28 MMOL/L (ref 21–32)
CREAT SERPL-MCNC: 0.57 MG/DL (ref 0.6–1)
GLOBULIN SER CALC-MCNC: 3.6 G/DL (ref 2.3–3.5)
GLUCOSE SERPL-MCNC: 109 MG/DL (ref 65–100)
POTASSIUM SERPL-SCNC: 4.2 MMOL/L (ref 3.5–5.1)
PROT SERPL-MCNC: 5.5 G/DL (ref 6.3–8.2)
SODIUM SERPL-SCNC: 141 MMOL/L (ref 136–145)

## 2022-01-15 PROCEDURE — 2709999900 HC NON-CHARGEABLE SUPPLY

## 2022-01-15 PROCEDURE — 74011250637 HC RX REV CODE- 250/637: Performed by: OBSTETRICS & GYNECOLOGY

## 2022-01-15 PROCEDURE — 65270000029 HC RM PRIVATE

## 2022-01-15 PROCEDURE — 36415 COLL VENOUS BLD VENIPUNCTURE: CPT

## 2022-01-15 PROCEDURE — 80053 COMPREHEN METABOLIC PANEL: CPT

## 2022-01-15 RX ORDER — SIMETHICONE 80 MG
80 TABLET,CHEWABLE ORAL
Status: DISCONTINUED | OUTPATIENT
Start: 2022-01-15 | End: 2022-01-17 | Stop reason: HOSPADM

## 2022-01-15 RX ORDER — IBUPROFEN 600 MG/1
600 TABLET ORAL
Status: DISCONTINUED | OUTPATIENT
Start: 2022-01-15 | End: 2022-01-17 | Stop reason: HOSPADM

## 2022-01-15 RX ORDER — DIPHENHYDRAMINE HCL 25 MG
25 CAPSULE ORAL
Status: DISCONTINUED | OUTPATIENT
Start: 2022-01-15 | End: 2022-01-17 | Stop reason: HOSPADM

## 2022-01-15 RX ORDER — PRENATAL VIT 96/IRON FUM/FOLIC 27MG-0.8MG
1 TABLET ORAL DAILY
Status: DISCONTINUED | OUTPATIENT
Start: 2022-01-15 | End: 2022-01-17 | Stop reason: HOSPADM

## 2022-01-15 RX ORDER — OXYCODONE HYDROCHLORIDE 5 MG/1
5 TABLET ORAL
Status: DISCONTINUED | OUTPATIENT
Start: 2022-01-15 | End: 2022-01-17 | Stop reason: HOSPADM

## 2022-01-15 RX ORDER — OXYTOCIN/RINGER'S LACTATE 30/500 ML
10 PLASTIC BAG, INJECTION (ML) INTRAVENOUS AS NEEDED
Status: DISCONTINUED | OUTPATIENT
Start: 2022-01-15 | End: 2022-01-17 | Stop reason: HOSPADM

## 2022-01-15 RX ORDER — OXYTOCIN/RINGER'S LACTATE 30/500 ML
87.3 PLASTIC BAG, INJECTION (ML) INTRAVENOUS AS NEEDED
Status: DISCONTINUED | OUTPATIENT
Start: 2022-01-15 | End: 2022-01-17 | Stop reason: HOSPADM

## 2022-01-15 RX ORDER — ZOLPIDEM TARTRATE 5 MG/1
5 TABLET ORAL
Status: DISCONTINUED | OUTPATIENT
Start: 2022-01-15 | End: 2022-01-17 | Stop reason: HOSPADM

## 2022-01-15 RX ORDER — ONDANSETRON 4 MG/1
4 TABLET, ORALLY DISINTEGRATING ORAL
Status: DISCONTINUED | OUTPATIENT
Start: 2022-01-15 | End: 2022-01-17 | Stop reason: HOSPADM

## 2022-01-15 RX ORDER — DOCUSATE SODIUM 100 MG/1
100 CAPSULE, LIQUID FILLED ORAL 2 TIMES DAILY
Status: DISCONTINUED | OUTPATIENT
Start: 2022-01-15 | End: 2022-01-17 | Stop reason: HOSPADM

## 2022-01-15 RX ORDER — NALOXONE HYDROCHLORIDE 0.4 MG/ML
0.4 INJECTION, SOLUTION INTRAMUSCULAR; INTRAVENOUS; SUBCUTANEOUS AS NEEDED
Status: DISCONTINUED | OUTPATIENT
Start: 2022-01-15 | End: 2022-01-17 | Stop reason: HOSPADM

## 2022-01-15 RX ADMIN — DOCUSATE SODIUM 100 MG: 100 CAPSULE ORAL at 08:58

## 2022-01-15 RX ADMIN — IBUPROFEN 600 MG: 600 TABLET ORAL at 14:51

## 2022-01-15 RX ADMIN — IBUPROFEN 600 MG: 600 TABLET ORAL at 21:10

## 2022-01-15 RX ADMIN — WITCH HAZEL 1 PAD: 500 SOLUTION RECTAL; TOPICAL at 02:59

## 2022-01-15 RX ADMIN — WITCH HAZEL 1 PAD: 500 SOLUTION RECTAL; TOPICAL at 21:10

## 2022-01-15 RX ADMIN — IBUPROFEN 600 MG: 600 TABLET ORAL at 08:58

## 2022-01-15 RX ADMIN — IBUPROFEN 600 MG: 600 TABLET ORAL at 02:59

## 2022-01-15 RX ADMIN — Medication 1 SPRAY: at 02:59

## 2022-01-15 RX ADMIN — DOCUSATE SODIUM 100 MG: 100 CAPSULE ORAL at 17:57

## 2022-01-15 NOTE — PROGRESS NOTES
01/15/22 0259   Pain Assessment   Pain Scale 1 Numeric (0 - 10)   Pain Intensity 1 3   Pain Location 1 Abdomen;Perineum   Pain Description 1 Aching;Cramping; Sore   Pain Intervention(s) 1 Medication (see MAR)   Vital Signs   Level of Consciousness Alert (0)   Pain Screen   Patient Stated Pain Goal 1   POSS Scale   Opioid Sedation Scale 1     PRN Motrin 600mg for pain.

## 2022-01-15 NOTE — PROGRESS NOTES
Care Management Interventions  PCP Verified by CM: Yes (Dr. Sunny Alcazar with [facility])  Support Systems: Spouse/Significant Other  Discharge Location  Discharge Placement: Home with family assistance  Spoke with pt regarding Advanced Planning. She does not wish to have that discussion at this time. Per her request I have indicated her spouse as healthcare decision maker. Discussed and provided patient with  informational packet on  mood and anxiety disorders (resources/education).

## 2022-01-15 NOTE — PROGRESS NOTES
Dr. Antonette Priest on unit. MD updated pt is still 4-4.5. Pt positioned to high thrones and continuing to go up on Pit.  MD ok with POC

## 2022-01-15 NOTE — PROGRESS NOTES
Progress Note                               Patient: Jaison Petit MRN: [id number]  SSN: [id number]    YOB: 1985  Age: 39 y.o. Sex: female      Postpartum Day Number 1    Subjective:     Patient reports overall doing well on PPD 1. She reports cramping and pressure, but controlled with current pain regimen. She has changed her pad 4-5 times since delivery. She is breastfeeding and notes that it is going well so far. She is ambulating to the restroom and voiding.      Objective:     Patient Vitals for the past 18 hrs:   Temp Pulse Resp BP SpO2   01/15/22 0850 97.4 °F (36.3 °C) 82 18 135/89 98 %   01/15/22 0600 97.7 °F (36.5 °C) 72 18 127/73 98 %   01/15/22 0315 98.5 °F (36.9 °C) 80 18 133/72 94 %   01/15/22 0139 98.7 °F (37.1 °C) 86 18 137/86 96 %   01/15/22 0043  87  134/78    01/15/22 0028  91  (!) 149/84    01/15/22 0013  80  (!) 155/85    01/14/22 2358  81  (!) 158/85    01/14/22 2343  78  (!) 155/78    01/14/22 2328  77  (!) 152/69    01/14/22 2313 97.9 °F (36.6 °C) 78  138/87    01/14/22 2259  (!) 106  126/85    01/14/22 2214  75  (!) 151/81    01/14/22 2157  76  (!) 145/90    01/14/22 2143 98.4 °F (36.9 °C) 75 18 (!) 153/92    01/14/22 2127  81  (!) 145/98    01/14/22 2121  88  (!) 145/95    01/14/22 2113  78  (!) 140/93    01/14/22 2042  74  (!) 135/93    01/14/22 2029  74  (!) 139/90    01/14/22 2013  83  (!) 145/94    01/14/22 1958  69  118/64    01/14/22 1943  71  135/69    01/14/22 1928  75  123/74    01/14/22 1915 98 °F (36.7 °C) 79 18 (!) 143/87    01/14/22 1858  78  (!) 161/79    01/14/22 1828  68  139/88    01/14/22 1812  73  (!) 134/92    01/14/22 1807  75  (!) 133/90    01/14/22 1802  69  139/86    01/14/22 1758  65  136/84    01/14/22 1752  64  139/83    01/14/22 1748  67  138/83    01/14/22 1742  66  139/82    01/14/22 1737  66  138/83    01/14/22 1732  72  133/86    01/14/22 1728  65  136/82    22 1723  68  138/84    22 1718  71  134/83    22 1712  80  137/81    22 1707  77  133/78    22 1704  74  138/79    22 1657  74  134/68    22 1654  76  137/80    22 1653  73  137/79    22 1652  73  (!) 145/82    22 1651  80  (!) 145/92    22 1650  81  (!) 143/85    22 1648  86  (!) 154/87    22 1647  75  (!) 146/87    22 1646  75  (!) 149/89    22 1644  76  (!) 135/93         Temp (24hrs), Av.1 °F (36.7 °C), Min:97.4 °F (36.3 °C), Max:98.7 °F (37.1 °C)      Physical Exam:    Patient without distress. Heart: Regular rate and rhythm  Lung: clear to auscultation throughout lung fields and normal respiratory effort  Abdomen: soft, nontender  Fundus: firm and non tender, -1  Lower Extremities:  - Edema No    Lab/Data Review: All lab results for the last 24 hours reviewed.     Assessment and Plan:     Postpartum care  - Pain controlled on current regimen  - Lochia appropriate, continue to monitor  - Continue routine postpartum care    GHTN  - Noted on admission with elevated BP which continued through induction process  - BP normotensive at present - CBC reassuring, mildly elevated AST - will repeat CMP this AM    GDM  - Plan 2 hr GTT at postpartum visit

## 2022-01-15 NOTE — LACTATION NOTE

## 2022-01-15 NOTE — PROGRESS NOTES
Patient up to bathroom with RN assistance. Mary-care taught and completed. Questions encouraged and answered. Patient ambulating without difficulty, encouraged to call for needs or concerns. Verbalizes understanding.

## 2022-01-15 NOTE — PROGRESS NOTES
SBAR OUT Report: Mother    Verbal report given to Ameena Rodas RN (full name & credentials) on this patient, who is now being transferred to MIU (unit) for routine progression of care. The patient is not wearing a green \"Anesthesia-Duramorph\" band. Report consisted of patient's Situation, Background, Assessment and Recommendations (SBAR).  ID bands were compared with the identification form, and verified with the patient and receiving nurse. Information from the SBAR and [place] and the [employer] Report was reviewed with the receiving nurse; opportunity for questions and clarification provided.

## 2022-01-15 NOTE — PROGRESS NOTES
Admission assessment complete as noted. Patient oriented to room and unit. Plan of care reviewed and patient verbalizes understanding. Questions encouraged and answered. Patent encouraged to call for needs or concerns. Safety Teaching reviewed:   1. Hand hygiene prior to handling the infant. 2. Use of bulb syringe. 3. Bracelets with matching numbers are placed on mother and infant  4. An infant security tag  [facility]) is placed on the infant's ankle and monitored  5. All OB nurses wear pink Employee badges - do not give your baby to anyone without proper identification. 6. Never leave the baby alone in the room. 7. The infant should be placed on their back to sleep. on a firm mattress. No toys should be placed in the crib. (safe sleep video offered to view)  8. Never shake the baby (video offered to view)  9. Infant fall prevention - do not sleep with the baby, and place the baby in the crib while ambulating. 8. Mother and Baby Care booklet given to Mother.

## 2022-01-15 NOTE — L&D DELIVERY NOTE
Delivery Summary    Patient: Edmundo Arciniega MRN: [id number]  SSN: [id number]    YOB: 1985  Age: 39 y.o. Sex: female       Information for the patient's :  Osmel Roper [[id number]]       Labor Events:    Labor: No    Steroids: None   Cervical Ripening Date/Time:       Cervical Ripening Type: None   Antibiotics During Labor: No   Rupture Identifier: Sac 1    Rupture Date/Time: 2022 2:45 PM   Rupture Type:     Amniotic Fluid Volume: Moderate    Amniotic Fluid Description: Clear    Amniotic Fluid Odor: None    Induction: AROM       Induction Date/Time: 2022 2:36 PM    Indications for Induction: Diabetes; Other(comment)    Augmentation: None   Augmentation Date/Time:      Indications for Augmentation:     Labor complications: None       Additional complications:        Delivery Events:  Indications For Episiotomy:     Episiotomy:     Perineal Laceration(s): 2nd   Repaired: Yes   Periurethral Laceration Location:      Repaired:     Labial Laceration Location:     Repaired:     Sulcal Laceration Location:     Repaired:     Vaginal Laceration Location:     Repaired:     Cervical Laceration Location:     Repaired:     Repair Suture: Vicryl 2-0   Number of Repair Packets:     Estimated Blood Loss (ml):  ml   Quantitative Blood Loss (ml)                Delivery Date: 2022    Delivery Time: 11:07 PM  Delivery Type: Vaginal, Spontaneous  Sex:  Female    Gestational Age: 39w0d   Delivery Clinician:  Estuardo Parks  Living Status: Living   Delivery Location: L&D 437          APGARS  One minute Five minutes Ten minutes   Skin color: 1   1        Heart rate: 2   2        Grimace: 2   2        Muscle tone: 2   2        Breathin   2        Totals: 9   9            Presentation: Vertex    Position: Right Occiput Anterior  Resuscitation Method:  Suctioning-bulb; Tactile Stimulation     Meconium Stained: None      Cord Information: 3 Vessels  Complications: None  Cord around:    Delayed cord clamping? Yes  Cord clamped date/time:2022 11:08 PM  Disposition of Cord Blood: Lab    Blood Gases Sent?: No    Placenta:  Date/Time: 2022 11:16 PM  Removal: Spontaneous      Appearance: Normal      Measurements:  Birth Weight: 3.255 kg      Birth Length: 50.8 cm      Head Circumference: 35 cm      Chest Circumference: 34 cm     Abdominal Girth:       Other Providers:   SOCORRO HILLIARD;FARNAZ HURST;MEGHA JORGE;JULITO HERNANDEZ, Obstetrician;Primary Nurse;Primary [place] Nurse;PressLabsub Tech           Group B Strep: No results found for: GRBSEXT, GRBSEXT  Information for the patient's :  Jojo Tamayo [[id number]]   No results found for: Michael Bullion, PCTDIG, BILI, ABORHEXT, [address]     Recent Labs     22  2338 22  2337   PCO2CB 44 70*   PO2CB 36 17   HCO3I  --  26.6*   SO2I  --  16.6*   IBD 2.7 3.8   SPECTI VENOUS CORD ARTERIAL CORD   PHICB 7.37 7.19          Delivery Note    Obstetrician:  Lory Koo MD    Assistant: none    Pre-Delivery Diagnosis: Term pregnancy, Induced labor and Pregnancy complicated by: gestational diabetes, COVID-19 diagnosis, recurrent pregnancy loss and gestational hypertension    Post-Delivery Diagnosis: Living  infant(s) and Female    Intrapartum Event: None    Procedure: Spontaneous vaginal delivery    Epidural: YES    Monitor:  Fetal Heart Tones - External and Uterine Contractions - External    Indications for instrumental delivery: none    Estimated Blood Loss: No data found    Episiotomy: None    Laceration(s):  2nd degree    Laceration(s) repair: YES    Presentation: Cephalic    Fetal Description: ward    Fetal Position: Right Occiput Anterior    Birth Weight: 3255 g    Birth Length: 50.8 cm    Apgar - One Minute: 9    Apgar - Five Minutes: 9    Umbilical Cord: 3 vessels present and Cord blood sent to lab for type, Rh, and Jorge L' test  Specimens: placenta, discarded  Complications:  none Cord Blood Results:   Information for the patient's :  Osmel Roper [[id number]]   No results found for: PCTABR, PCTDIG, BILI, ABORH     Prenatal Labs:     Lab Results   Component Value Date/Time    ABO/Rh(D) A POSITIVE 2022 12:35 PM      Procedure Description:  Patient reached c/c/+1 and pushed to deliver a live female infant Apgar's of 9 and 9. Fetal head delivered in controlled manner RADHA. Anterior shoulder delivered by gentle downward guidance followed by posterior shoulder and remainder of fetal body with usual maneuvers. Mouth and nose were bulb suctioned and cord doubly clamped and cut in between after a 1 minute delay. Infant placed in warmer for assessment by nursing staff per patient preference. Cord blood and gas obtained for routine analysis then cord blood collected for umbilical cord blood banking. Placenta then expressed and delivered without any difficulty. Second degree perineal laceration then repaired in standard fashion with 2-0 Vicryl without any difficulty. Fundus firm and lochia appropriate at procedure conclusion. Mother and infant stable immediately postpartum.     Attending Attestation: I was present and scrubbed for the entire procedure

## 2022-01-15 NOTE — PROGRESS NOTES
Labor Progress Note  Patient seen, fetal heart rate and contraction pattern evaluated, patient examined. Feeling some pressure with contractions. Reports a headache, frontal, no visual disturbance. Patient Vitals for the past 1 hrs:   BP Pulse   01/14/22 2127 (!) 145/98 81   01/14/22 2121 (!) 145/95 88   01/14/22 2113 (!) 140/93 78       Physical Exam:  Cervical Exam:  8 cm dilated    90% effaced    0 station    Membranes:  Artificial Rupture of Membranes; Amniotic Fluid: medium amount of clear fluid  Uterine Activity: Frequency: Every 2-3 minutes  Fetal Heart Rate: Baseline: 130 per minute  Variability: moderate  Accelerations: yes  Decelerations: none    Assessment/Plan:  Reassuring fetal status, Continue plan for vaginal delivery. SVE 8/90/0. Pitocin at 28. Continue with current management.

## 2022-01-15 NOTE — PROGRESS NOTES
SBAR IN Report: Mother    Verbal report received from Whitley HARVEY RN on this patient, who is now being transferred from L&D for routine progression of care. The patient is not wearing a green \"Anesthesia-Duramorph\" band. Report consisted of patient's Situation, Background, Assessment and Recommendations (SBAR). [place] ID bands were compared with the identification form, and verified with the patient and transferring nurse. Information from the SBAR and the [place] Report was reviewed with the transferring nurse; opportunity for questions and clarification provided.

## 2022-01-16 PROCEDURE — 77030027138 HC INCENT SPIROMETER -A

## 2022-01-16 PROCEDURE — 65270000029 HC RM PRIVATE

## 2022-01-16 PROCEDURE — 74011250637 HC RX REV CODE- 250/637: Performed by: OBSTETRICS & GYNECOLOGY

## 2022-01-16 PROCEDURE — 2709999900 HC NON-CHARGEABLE SUPPLY

## 2022-01-16 RX ORDER — POLYETHYLENE GLYCOL 3350 17 G/17G
17 POWDER, FOR SOLUTION ORAL DAILY PRN
Status: DISCONTINUED | OUTPATIENT
Start: 2022-01-16 | End: 2022-01-17 | Stop reason: HOSPADM

## 2022-01-16 RX ORDER — IBUPROFEN 600 MG/1
600 TABLET ORAL
Qty: 40 TABLET | Refills: 0 | Status: SHIPPED | OUTPATIENT
Start: 2022-01-16 | End: 2022-01-26

## 2022-01-16 RX ADMIN — WITCH HAZEL 1 PAD: 500 SOLUTION RECTAL; TOPICAL at 16:31

## 2022-01-16 RX ADMIN — IBUPROFEN 600 MG: 600 TABLET ORAL at 09:19

## 2022-01-16 RX ADMIN — IBUPROFEN 600 MG: 600 TABLET ORAL at 03:07

## 2022-01-16 RX ADMIN — PRENATAL VIT W/ FE FUMARATE-FA TAB 27-0.8 MG 1 TABLET: 27-0.8 TAB at 09:19

## 2022-01-16 RX ADMIN — DOCUSATE SODIUM 100 MG: 100 CAPSULE ORAL at 16:31

## 2022-01-16 RX ADMIN — IBUPROFEN 600 MG: 600 TABLET ORAL at 14:52

## 2022-01-16 RX ADMIN — IBUPROFEN 600 MG: 600 TABLET ORAL at 21:46

## 2022-01-16 RX ADMIN — DOCUSATE SODIUM 100 MG: 100 CAPSULE ORAL at 09:19

## 2022-01-16 RX ADMIN — POLYETHYLENE GLYCOL 3350 17 G: 17 POWDER, FOR SOLUTION ORAL at 14:52

## 2022-01-16 NOTE — PROGRESS NOTES
Dr. María Elena Price at nurses station, per MD pt does not need repeat labs for AST and ALT. Also pt can d/c her aspirin 81mg (pt taking at home while pregnant). Orders read back and verified. Pt made aware of the above.

## 2022-01-16 NOTE — PROGRESS NOTES
Dr. Mandy Ibarra notified that pt recent BP was 141/91 and that BP at 1140 was 139/82. Per MD will continue to monitor at this time. No new orders received.

## 2022-01-16 NOTE — LACTATION NOTE
This note was copied from a baby's chart. Noted mom struggling with latching on L. Assisted with attempt in cross cradle and football. No latch. In football on R, baby lathed easily. Will continue to try on L.  L nipple is slightly shorter than R.  Pumping L when baby is not latching. Encouraged pumping and skin to skin. Encouraged to watch output and frequent feedings.

## 2022-01-16 NOTE — DISCHARGE SUMMARY
Obstetrical Discharge Summary     Name: Toña Matson MRN: [id number]  SSN: [id number]    YOB: 1985  Age: 39 y.o. Sex: female      Allergies: Nickel    Admit Date: 2022    Discharge Date: 2022     Admitting Physician: Court Blank MD     Attending Physician:  Leyla Mccollum MD     * Admission Diagnoses: Encounter for induction of labor [Z34.90]    * Discharge Diagnoses:   Information for the patient's :  Angelika Hirschfeld [[id number]]   Delivery of a 3.255 kg female infant via Vaginal, Spontaneous on 2022 at 11:07 PM  by Unknown Maggy. Apgars were 9  and 9 . Additional Diagnoses:   Hospital Problems as of 2022 Date Reviewed: 1/15/2022          Codes Class Noted - Resolved POA    * (Principal) Encounter for induction of labor ICD-10-CM: Z34.90  ICD-9-CM: V22.1  2022 - Present Yes        Personal history of COVID-19 ICD-10-CM: L11.09  ICD-9-CM: V12.09  2022 - Present Yes        39 weeks gestation of pregnancy ICD-10-CM: Z3A.39  ICD-9-CM: V22.2  2022 - Present Yes        Gestational diabetes mellitus (GDM) affecting pregnancy ICD-10-CM: O24.419  ICD-9-CM: 648.83  2022 - Present Yes        Chronic hypertension affecting pregnancy ICD-10-CM: O10.919  ICD-9-CM: 642.00  9/3/2018 - Present Yes    Overview Addendum 2021  9:35 AM by Victorina Pena     21 /85 at last OB  2021 at [facility]: BP WNL today. Draw PreE labs if elevations continue. Lab Results   Component Value Date/Time    ABO/Rh(D) A POSITIVE 2022 12:35 PM      Immunization History   Administered Date(s) Administered    COVID-19, PFIZER, MRNA, LNP-S, PF, 30MCG/0.3ML DOSE 01/15/2021, 2021, 2021       * Procedures:     * Discharge Condition: good    * Hospital Course: Angeles Masterson was admitted for induction of labor at 39.0 wkg due to COVID-19 diagnosis in late third trimester as well as gestational diabetes.  She was noted to have elevated BP on admission, and CMP was drawn which noted a mildly elevated AST. She went on to deliver a live female infant with Apgar's of 9 and 9 via  without any difficulty. On postpartum day 1, repeat CMP obtained and mildly elevated AST was noted to be stable. She was monitored until postpartum day 2 at which point she was felt to be stable for discharge home. She was discharged to home with instructions to follow-up in office this week for BP check and repeat CMP. * Disposition: Home    Discharge Medications:   Current Discharge Medication List      START taking these medications    Details   ibuprofen (MOTRIN) 600 mg tablet Take 1 Tablet by mouth every six (6) hours as needed for Pain for up to 10 days. Qty: 40 Tablet, Refills: 0  Start date: 2022, End date: 2022         CONTINUE these medications which have NOT CHANGED    Details   aspirin delayed-release 81 mg tablet Take  by mouth daily. prenatal vit-iron fumarate-fa (RIGHT STEP PRENATAL VITAMINS) 27 mg iron- 0.8 mg tab tablet Take 1 Tab by mouth daily. STOP taking these medications       acetaminophen (TylenoL) 325 mg tablet Comments:   Reason for Stopping:         cholecalciferol, vitamin D3, (VITAMIN D3 PO) Comments:   Reason for Stopping:               * Follow-up Care/Patient Instructions: Activity: Activity as tolerated  Diet: Regular Diet  Wound Care: Ice to area for comfort and As directed    Follow-up Information     Follow up With Specialties Details Why Contact Info    John Larsen MD Obstetrics & Gynecology In 1 week  [address]  [phone]      Bsi, Not On File, MD Oncology   Not On File (62) Patient has a PCP but that physician is not listed in [facility]. Maria Alejandra Abrams

## 2022-01-16 NOTE — PROGRESS NOTES
Progress Note                               Patient: Shanita Caldwell MRN: [id number]  SSN: [id number]    YOB: 1985  Age: 39 y.o. Sex: female      Postpartum Day Number 2    Subjective:     Patient seen and examined at bedside. Reports clot this morning when getting up to restroom, mild bleeding since. Cramping controlled with current pain regimen. Baby girl, Jasson Doan, is at bedside and doing well. She is breastfeeding and pumping as well. Objective:     Patient Vitals for the past 18 hrs:   Temp Pulse Resp BP SpO2   22 0710 97.3 °F (36.3 °C) 68 12 136/85 98 %   22 0300 97.6 °F (36.4 °C) 71 18 124/74 97 %   01/15/22 2249 97.5 °F (36.4 °C) 69 16 (!) 146/83 98 %   01/15/22 2113 97.6 °F (36.4 °C) 84 18 135/76 97 %        Temp (24hrs), Av.6 °F (36.4 °C), Min:97.3 °F (36.3 °C), Max:97.9 °F (36.6 °C)      Physical Exam:    Patient without distress. Heart: Regular rate and rhythm  Lung: clear to auscultation throughout lung fields and normal respiratory effort  Abdomen: soft, nontender  Fundus: firm and non tender, -2  Lower Extremities:  - Edema 1+    Lab/Data Review:  BMP/CMP:    Recent Labs     01/15/22  1137 22  1526    135*   K 4.2 4.1   * 104   CO2 28 23   AGAP 4* 8   * 89   BUN 9 11   CREA 0.57* 0.46*   GFRAA >60 >60   GFRNA >60 >60   CA 8.9 9.0   ALB 1.9* 2.4*   TP 5.5* 6.7   * 240*   GLOB 3.6* 4.3*   AGRAT 0.5* 0.6*   ALT 49 44       Assessment and Plan:     Postpartum care  - Pain controlled on current regimen  - Ambulating, voiding without issue  - Lochia appropriate  - Breastfeeding + pumping  - Plan d/c home today     GHTN  - Noted on admission with elevated BP which continued through induction process  - BP normotensive at present, occasional elevations in to mild range. CMP repeated yesterday due to mildly elevated AST on admission - stable. - Plan follow-up in office this week for BP check + repeat CMP.  AST elevation could also be related to recent COVID infection.   - Continue ASA postpartum x 6 wks    GDM  - Plan 2 hr GTT at postpartum visit

## 2022-01-16 NOTE — LACTATION NOTE
Patient called out for help getting the infant to latch. Infant would not latch at that time. Primary RN suggested to patient that she pump and possibly supplement with formula. Patient pumped for 15 minutes and got 12 mls of colostrum. The colostrum was fed to the infant via curved tip syringe. Infant given 5 mls of Similac Pro Advance to top infant off. Patient shown how to wash pump parts and nipple care.

## 2022-01-16 NOTE — PROGRESS NOTES
01/15/22 2110   Pain Assessment   Pain Scale 1 Numeric (0 - 10)   Pain Intensity 1 3   Pain Location 1 Abdomen;Perineum   Pain Description 1 Aching;Cramping; Sore   Pain Intervention(s) 1 Medication (see MAR)   Vital Signs   Level of Consciousness Alert (0)   Pain Screen   Patient Stated Pain Goal 2   POSS Scale   Opioid Sedation Scale 1     PRN Motrin 600mg for pain

## 2022-01-16 NOTE — PROGRESS NOTES
Shift assessment complete see flowsheet. Informed pt to wear a mask with staff is in room, pt voiced understanding. Discussed today plan of care with pt. Bleeding precautions given. Pt does have some congestion and an \"occasional cough\". Pt denies h/a,vision changes, n/v, loss of taste or smell, or sore throat. Pt given incentive and educated on how to use, per used incentive properly. Pt noted to have bruising to right side of her face on her upper cheek and on her left leg near her knee, pt states she feel on [address]. Questions encouraged and answered. Pt to call with needs./concerns. pt in bed with call light in reach. Encouraged pt to ambulate in room as much as possible.    EPDS 0    Pt COVID symptoms ( muscle aches and fever started on 1/8/22)   Covid positive on 1/9/22

## 2022-01-17 VITALS
RESPIRATION RATE: 18 BRPM | OXYGEN SATURATION: 97 % | HEIGHT: 63 IN | HEART RATE: 83 BPM | WEIGHT: 183 LBS | SYSTOLIC BLOOD PRESSURE: 138 MMHG | DIASTOLIC BLOOD PRESSURE: 85 MMHG | TEMPERATURE: 98.5 F | BODY MASS INDEX: 32.43 KG/M2

## 2022-01-17 PROCEDURE — 74011250637 HC RX REV CODE- 250/637: Performed by: OBSTETRICS & GYNECOLOGY

## 2022-01-17 RX ADMIN — IBUPROFEN 600 MG: 600 TABLET ORAL at 03:25

## 2022-01-17 RX ADMIN — PRENATAL VIT W/ FE FUMARATE-FA TAB 27-0.8 MG 1 TABLET: 27-0.8 TAB at 09:03

## 2022-01-17 RX ADMIN — DOCUSATE SODIUM 100 MG: 100 CAPSULE ORAL at 09:03

## 2022-01-17 RX ADMIN — IBUPROFEN 600 MG: 600 TABLET ORAL at 09:05

## 2022-01-17 NOTE — PROGRESS NOTES
Shift assessment complete as noted. Patient resting comfortably. Questions encouraged and answered. Encouraged to call for needs or concerns. Verbalizes understanding. Spouse at bedside.

## 2022-01-17 NOTE — PROGRESS NOTES
Pt given scheduled Colace PO and Prenatal PO. Motrin 600 mg PO given to pt per request.  Educated pt to call out if pain medication does not help.

## 2022-01-17 NOTE — PROGRESS NOTES
Postpartum Progress Note (VD)    Patient: Anastasia Gallego MRN: [id number]  SSN: [id number]    YOB: 1985  Age: 39 y.o. Sex: female      Subjective:     Postpartum Day: 3     Delivery: Spontaneous vaginal delivery    The patient feels well. The patient denies emotional concerns. Pain is  well controlled with current medications. Voiding spontaneous. The patient is ambulating well. The patient is tolerating a normal diet. Lochia is light. She denies HA, visual changes, and RUQ pain. The baby is well. Baby is feeding via breast.    Objective:      Patient Vitals for the past 8 hrs:   BP Temp Pulse Resp SpO2   01/17/22 0814 137/87 97.4 °F (36.3 °C) 78 18 98 %   01/17/22 0330 124/83 97.4 °F (36.3 °C) 75 18 96 %     General:    alert, cooperative, no distress   Bowel Sounds:  active   Lochia:  appropriate   Uterine Fundus:   firm   Fundus Location:  -1   Perineum:  Deferred   DVT Evaluation:  No evidence of DVT seen on physical exam.     Lab/Data Review: All lab results for the last 24 hours reviewed. Assessment:     Status post: Doing well postpartum vaginal delivery. Pregnancy complicated by late 3rd trimester COVID, GDM, and GHTN. Plan:     - Postpartum care discussed including diet, ambulation, and actvitiy restrictions. - COVID infection: Out of 5 day quarantine but still in 10 day mask  - GHTN: No sx PreE. BP mild. Mild liver enzyme elevation likely covid related. Will check at office follow up. - GDM: Plan 2hr GTT 6-8wk pp  - Discharge planning for PPD #3, today (no change from d/c summary yesterday)  - Discharge instructions and questions answered for vaginal delivery.     Sarah Butterfield MD

## 2022-01-17 NOTE — DISCHARGE INSTRUCTIONS
Discharge instruction to follow: Activity: Pelvis rest for 6 weeks     No heavy lifting over 15 lbs for 2 weeks     No driving for 2 weeks     No push/pull motion such as sweeping or vacuuming for 2 weeks     No tub baths for 6 weeks    Continue using the gordo-bottle after each void or bowel movement. If using sitz bath continue until comfortable stopping. Change sanitary pad after each urination or bowel movement. Call MD for the following:      Fever over 101 F; pain not relieved by medication; foul smelling vaginal discharge or an increase in vaginal bleeding. Take medication as prescribed. Follow up with MD as order. DISCHARGE SUMMARY from Nurse    PATIENT INSTRUCTIONS:    What to do at Home:  Recommended activity: Activity as tolerated,     *  Please give a list of your current medications to your Primary Care Provider. *  Please update this list whenever your medications are discontinued, doses are      changed, or new medications (including over-the-counter products) are added. *  Please carry medication information at all times in case of emergency situations. These are general instructions for a healthy lifestyle:    No smoking/ No tobacco products/ Avoid exposure to second hand smoke  Surgeon General's Warning:  Quitting smoking now greatly reduces serious risk to your health. Obesity, smoking, and sedentary lifestyle greatly increases your risk for illness    A healthy diet, regular physical exercise & weight monitoring are important for maintaining a healthy lifestyle    You may be retaining fluid if you have a history of heart failure or if you experience any of the following symptoms:  Weight gain of 3 pounds or more overnight or 5 pounds in a week, increased swelling in our hands or feet or shortness of breath while lying flat in bed. Please call your doctor as soon as you notice any of these symptoms; do not wait until your next office visit.         The discharge information has been reviewed with the patient. The patient verbalized understanding. Discharge medications reviewed with the patient and appropriate educational materials and side effects teaching were provided. ___________________________________________________________________________________________________________________________________  Patient Education        Vaginal Childbirth: Care Instructions  Overview     Vaginal birth means delivering a baby through the birth canal (vagina). During labor, the uterus tightens (contracts) regularly to thin and open the cervix and to push the baby out through the birth canal.  Your body will slowly heal in the next few weeks. It's easy to get too tired and overwhelmed during the first weeks after your baby is born. Changes in your hormones can shift your mood without warning. You may find it hard to meet the extra demands on your energy and time. Take it easy on yourself. Follow-up care is a key part of your treatment and safety. Be sure to make and go to all appointments, and call your doctor if you are having problems. It's also a good idea to know your test results and keep a list of the medicines you take. How can you care for yourself at home? Vaginal bleeding and cramps  · After delivery, you will have a bloody discharge from your vagina. This will turn pink within a week and then white or yellow after about 10 days. It may last for 2 to 4 weeks or longer, until the uterus has healed. Use sanitary pads until you stop bleeding. Using pads makes it easier to monitor your bleeding. · Don't worry if you pass some blood clots, as long as they are smaller than a golf ball. If you have a tear or stitches in your vaginal area, change the pad at least every 4 hours. This will help prevent soreness and infection. · You may have cramps for the first few days after childbirth. These are normal and occur as the uterus shrinks to normal size.  Take an over-the-counter pain medicine, such as acetaminophen (Tylenol), ibuprofen (Advil, Motrin), or naproxen (Aleve), for cramps. Read and follow all instructions on the label. Do not take aspirin, because it can cause more bleeding. · Do not take two or more pain medicines at the same time unless the doctor told you to. Many pain medicines have acetaminophen, which is Tylenol. Too much acetaminophen (Tylenol) can be harmful. Stitches  · If you have stitches, they will dissolve on their own and don't need to be removed. Follow your doctor's instructions for cleaning the stitched area. · Put ice or a cold pack on your painful area for 10 to 20 minutes at a time, several times a day, for the first few days. Put a thin cloth between the ice and your skin. · Sit in a few inches of warm water (sitz bath) 3 times a day and after bowel movements. The warm water helps with pain and itching. If you don't have a tub, a warm shower might help. Breast fullness  · Your breasts may overfill (engorge) in the first few days after delivery. To help milk flow and to relieve pain, warm your breasts in the shower or by using warm, moist towels before nursing. · If you aren't nursing, don't put warmth on your breasts or touch your breasts. Wear a bra that fits well and use ice until the fullness goes away. This usually takes 2 to 3 days. · Put ice or a cold pack on your breast after nursing to reduce swelling and pain. Put a thin cloth between the ice and your skin. Activity  · Eat a balanced diet. Don't try to lose weight by cutting calories. Keep taking your prenatal vitamins, or take a multivitamin. · Get as much rest as you can. Try to take naps when your baby sleeps during the day. · Get some exercise every day. But don't do any heavy exercise until your doctor says it is okay. · Wait until you are healed (about 4 to 6 weeks) before you have sexual intercourse. Your doctor will tell you when it is okay to have sex.   · If you don't want to get pregnant, talk to your doctor about birth control. You can get pregnant even before your period returns. Also, you can get pregnant while you are breastfeeding. Mental health  · It's normal to have some sadness, anxiety, sleeplessness, and mood swings after you go home. If you feel upset or hopeless for more than a few days or are having trouble doing the things you need to do, talk to your doctor. Constipation and hemorrhoids  · Drink plenty of fluids. If you have kidney, heart, or liver disease and have to limit fluids, talk with your doctor before you increase the amount of fluids you drink. · Eat plenty of fiber each day. Have a bran muffin or bran cereal for breakfast. Try eating a piece of fruit for a mid-afternoon snack. · For painful, itchy hemorrhoids, put ice or a cold pack on the area several times a day for 10 minutes at a time. Follow this by putting a warm compress on the area for another 10 to 20 minutes or by sitting in a shallow, warm bath. When should you call for help? Call 911  anytime you think you may need emergency care. For example, call if:    · You have thoughts of harming yourself, your baby, or another person.     · You passed out (lost consciousness).     · You have chest pain, are short of breath, or cough up blood.     · You have a seizure. Call your doctor now or seek immediate medical care if:    · You have severe vaginal bleeding.     · You are dizzy or lightheaded, or you feel like you may faint.     · You have a fever.     · You have new or more pain in your belly or pelvis.     · You have symptoms of a blood clot in your leg (called a deep vein thrombosis), such as:  ? Pain in the calf, back of the knee, thigh, or groin. ? Redness and swelling in your leg or groin.     · You have signs of preeclampsia, such as:  ? Sudden swelling of your face, hands, or feet. ? New vision problems (such as dimness, blurring, or seeing spots). ? A severe headache.    Watch closely for changes in your health, and be sure to contact your doctor if:    · Your vaginal bleeding seems to be getting heavier.     · You have new or worse vaginal discharge.     · You feel sad, anxious, or hopeless for more than a few days.     · You do not get better as expected. Where can you learn more? Go to http://www.gray.com/  Enter Q237 in the search box to learn more about \"Vaginal Childbirth: Care Instructions. \"  Current as of: June 16, 2021               Content Version: 13.0  © 3379-7232 [facility]. Care instructions adapted under license by [employer] (which disclaims liability or warranty for this information). If you have questions about a medical condition or this instruction, always ask your healthcare professional. [address] any warranty or liability for your use of this information.

## 2022-01-18 PROCEDURE — 74011250636 HC RX REV CODE- 250/636: Performed by: NURSE ANESTHETIST, CERTIFIED REGISTERED

## 2022-03-18 PROBLEM — O26.20 PREGNANCY WITH HISTORY OF MULTIPLE PREGNANCY LOSS: Status: ACTIVE | Noted: 2020-05-28

## 2022-03-19 PROBLEM — O47.1 FALSE LABOR AFTER 37 WEEKS OF GESTATION WITHOUT DELIVERY: Status: ACTIVE | Noted: 2022-01-09

## 2022-03-19 PROBLEM — Z86.32 HISTORY OF GESTATIONAL DIABETES IN PRIOR PREGNANCY, CURRENTLY PREGNANT IN SECOND TRIMESTER: Status: ACTIVE | Noted: 2018-09-03

## 2022-03-19 PROBLEM — U07.1 COVID-19 AFFECTING PREGNANCY IN THIRD TRIMESTER: Status: ACTIVE | Noted: 2022-01-09

## 2022-03-19 PROBLEM — O09.522 HIGH RISK PREGNANCY, MULTIGRAVIDA OF ADVANCED MATERNAL AGE IN SECOND TRIMESTER: Status: ACTIVE | Noted: 2021-08-31

## 2022-03-19 PROBLEM — O09.812 PREGNANCY RESULTING FROM ASSISTED REPRODUCTIVE TECHNOLOGY IN SECOND TRIMESTER: Status: ACTIVE | Noted: 2021-09-02

## 2022-03-19 PROBLEM — O99.891 BACK PAIN AFFECTING PREGNANCY IN THIRD TRIMESTER: Status: ACTIVE | Noted: 2022-01-09

## 2022-03-19 PROBLEM — M54.9 BACK PAIN AFFECTING PREGNANCY IN THIRD TRIMESTER: Status: ACTIVE | Noted: 2022-01-09

## 2022-03-19 PROBLEM — O98.513 COVID-19 AFFECTING PREGNANCY IN THIRD TRIMESTER: Status: ACTIVE | Noted: 2022-01-09

## 2022-03-19 PROBLEM — Z86.16 PERSONAL HISTORY OF COVID-19: Status: ACTIVE | Noted: 2022-01-14

## 2022-03-19 PROBLEM — Z34.90 ENCOUNTER FOR INDUCTION OF LABOR: Status: ACTIVE | Noted: 2022-01-14

## 2022-03-19 PROBLEM — O24.419 GESTATIONAL DIABETES MELLITUS (GDM) AFFECTING PREGNANCY: Status: ACTIVE | Noted: 2022-01-14

## 2022-03-19 PROBLEM — O09.292 HISTORY OF GESTATIONAL DIABETES IN PRIOR PREGNANCY, CURRENTLY PREGNANT IN SECOND TRIMESTER: Status: ACTIVE | Noted: 2018-09-03

## 2022-03-19 PROBLEM — O10.919 CHRONIC HYPERTENSION AFFECTING PREGNANCY: Status: ACTIVE | Noted: 2018-09-03

## 2022-03-19 PROBLEM — Z3A.39 39 WEEKS GESTATION OF PREGNANCY: Status: ACTIVE | Noted: 2022-01-14

## 2022-03-20 PROBLEM — Z92.29 COVID-19 VACCINE SERIES COMPLETED: Status: ACTIVE | Noted: 2021-09-02

## 2022-05-26 ENCOUNTER — APPOINTMENT (RX ONLY)
Dept: URBAN - METROPOLITAN AREA CLINIC 329 | Facility: CLINIC | Age: 37
Setting detail: DERMATOLOGY
End: 2022-05-26

## 2022-05-26 DIAGNOSIS — D22 MELANOCYTIC NEVI: ICD-10-CM

## 2022-05-26 DIAGNOSIS — L81.4 OTHER MELANIN HYPERPIGMENTATION: ICD-10-CM

## 2022-05-26 DIAGNOSIS — Z80.8 FAMILY HISTORY OF MALIGNANT NEOPLASM OF OTHER ORGANS OR SYSTEMS: ICD-10-CM

## 2022-05-26 DIAGNOSIS — L82.1 OTHER SEBORRHEIC KERATOSIS: ICD-10-CM

## 2022-05-26 DIAGNOSIS — D18.0 HEMANGIOMA: ICD-10-CM

## 2022-05-26 PROBLEM — D18.01 HEMANGIOMA OF SKIN AND SUBCUTANEOUS TISSUE: Status: ACTIVE | Noted: 2022-05-26

## 2022-05-26 PROBLEM — D22.61 MELANOCYTIC NEVI OF RIGHT UPPER LIMB, INCLUDING SHOULDER: Status: ACTIVE | Noted: 2022-05-26

## 2022-05-26 PROBLEM — D22.5 MELANOCYTIC NEVI OF TRUNK: Status: ACTIVE | Noted: 2022-05-26

## 2022-05-26 PROBLEM — D22.62 MELANOCYTIC NEVI OF LEFT UPPER LIMB, INCLUDING SHOULDER: Status: ACTIVE | Noted: 2022-05-26

## 2022-05-26 PROCEDURE — ? ADDITIONAL NOTES

## 2022-05-26 PROCEDURE — ? SUNSCREEN RECOMMENDATIONS

## 2022-05-26 PROCEDURE — ? COUNSELING

## 2022-05-26 PROCEDURE — 99213 OFFICE O/P EST LOW 20 MIN: CPT

## 2022-05-26 PROCEDURE — ? FULL BODY SKIN EXAM

## 2022-05-26 PROCEDURE — ? DERMATOSCOPIC EVALUATION

## 2022-05-26 ASSESSMENT — LOCATION DETAILED DESCRIPTION DERM
LOCATION DETAILED: LEFT INFERIOR MEDIAL MIDBACK
LOCATION DETAILED: LEFT PROXIMAL POSTERIOR UPPER ARM
LOCATION DETAILED: RIGHT SUPERIOR MEDIAL UPPER BACK
LOCATION DETAILED: RIGHT MEDIAL UPPER BACK
LOCATION DETAILED: MIDDLE STERNUM
LOCATION DETAILED: RIGHT VENTRAL PROXIMAL FOREARM
LOCATION DETAILED: EPIGASTRIC SKIN
LOCATION DETAILED: RIGHT INFERIOR MEDIAL MIDBACK

## 2022-05-26 ASSESSMENT — LOCATION SIMPLE DESCRIPTION DERM
LOCATION SIMPLE: LEFT POSTERIOR UPPER ARM
LOCATION SIMPLE: RIGHT FOREARM
LOCATION SIMPLE: ABDOMEN
LOCATION SIMPLE: RIGHT UPPER BACK
LOCATION SIMPLE: CHEST
LOCATION SIMPLE: LEFT LOWER BACK
LOCATION SIMPLE: RIGHT LOWER BACK

## 2022-05-26 ASSESSMENT — LOCATION ZONE DERM
LOCATION ZONE: ARM
LOCATION ZONE: TRUNK

## 2022-09-27 ENCOUNTER — OFFICE VISIT (OUTPATIENT)
Dept: ORTHOPEDIC SURGERY | Age: 37
End: 2022-09-27
Payer: COMMERCIAL

## 2022-09-27 DIAGNOSIS — M67.432 GANGLION CYST OF DORSUM OF LEFT WRIST: Primary | ICD-10-CM

## 2022-09-27 PROCEDURE — 99204 OFFICE O/P NEW MOD 45 MIN: CPT | Performed by: ORTHOPAEDIC SURGERY

## 2022-09-27 NOTE — PROGRESS NOTES
[facility] Note    Name: Van Maldonado  YOB: 1985  Gender: female  MRN: [id number]      CC: Patient referred for evaluation of upper extremity pain    HPI: Van Maldonado is a 39 y.o. female Right hand dominant with a chief complaint of left dorsal wrist mass, this has been present for 3 months, it causes some discomfort but she thinks mostly because the wristwatch causes pressure over it, denies numbness or paresthesias. ROS/Meds/PSH/PMH/FH/SH: I personally reviewed the patients standard intake form. Pertinents are discussed in the HPI    Physical Examination:  General: Awake and alert. HEENT: Normocephalic, atraumatic  CV/Pulm: Breathing even and unlabored  Skin: No obvious rashes noted. Lymphatic: No obvious evidence of lymphedema or lymphadenopathy    Musculoskeletal Exam:  Examination on the left upper extremity demonstrates cap refill < 5 seconds in all fingers, 2 x 2 centimeter mass on the dorsal aspect of the wrist overlying the radiocarpal joint, this is rubbery, mobile, negative Tinel at the mass, normal sensation in all fingers. Imaging / Electrodiagnostic Tests:     Ultrasound examination demonstrates a 2 x 2 centimeter hypoechoic mass consistent with a fluid-filled ganglion cyst, there is a stalk arising from the scapholunate joint    Assessment:   1. Ganglion cyst of dorsum of left wrist        Plan:   We discussed the diagnosis and different treatment options. We discussed observation, therapy, antiinflammatory medications and other pertinent treatment modalities. After discussing in detail the patient elects to proceed with observation for now, we discussed all treatment options for ganglion cyst including steroid injection and aspiration as well as surgical excision of the mass. At this point this is not causing significant discomfort and she would like to watch it for now.      Patient voiced accordance and understanding of the information provided and the formulated plan. All questions were answered to the patient's satisfaction during the encounter.     Marianna Gage MD  Orthopaedic Surgery  09/27/22  3:32 PM

## 2023-08-16 ENCOUNTER — APPOINTMENT (RX ONLY)
Dept: URBAN - METROPOLITAN AREA CLINIC 329 | Facility: CLINIC | Age: 38
Setting detail: DERMATOLOGY
End: 2023-08-16

## 2023-08-16 DIAGNOSIS — L72.0 EPIDERMAL CYST: ICD-10-CM

## 2023-08-16 DIAGNOSIS — L82.1 OTHER SEBORRHEIC KERATOSIS: ICD-10-CM

## 2023-08-16 DIAGNOSIS — D22 MELANOCYTIC NEVI: ICD-10-CM

## 2023-08-16 DIAGNOSIS — L81.4 OTHER MELANIN HYPERPIGMENTATION: ICD-10-CM

## 2023-08-16 DIAGNOSIS — D18.0 HEMANGIOMA: ICD-10-CM

## 2023-08-16 DIAGNOSIS — Z85.828 PERSONAL HISTORY OF OTHER MALIGNANT NEOPLASM OF SKIN: ICD-10-CM

## 2023-08-16 DIAGNOSIS — L57.8 OTHER SKIN CHANGES DUE TO CHRONIC EXPOSURE TO NONIONIZING RADIATION: ICD-10-CM | Status: STABLE

## 2023-08-16 DIAGNOSIS — L57.0 ACTINIC KERATOSIS: ICD-10-CM

## 2023-08-16 PROBLEM — D22.5 MELANOCYTIC NEVI OF TRUNK: Status: ACTIVE | Noted: 2023-08-16

## 2023-08-16 PROBLEM — D18.01 HEMANGIOMA OF SKIN AND SUBCUTANEOUS TISSUE: Status: ACTIVE | Noted: 2023-08-16

## 2023-08-16 PROCEDURE — 99213 OFFICE O/P EST LOW 20 MIN: CPT | Mod: 25

## 2023-08-16 PROCEDURE — ? COUNSELING

## 2023-08-16 PROCEDURE — ? ADDITIONAL NOTES

## 2023-08-16 PROCEDURE — 17003 DESTRUCT PREMALG LES 2-14: CPT

## 2023-08-16 PROCEDURE — ? LIQUID NITROGEN

## 2023-08-16 PROCEDURE — ? FULL BODY SKIN EXAM

## 2023-08-16 PROCEDURE — 17000 DESTRUCT PREMALG LESION: CPT

## 2023-08-16 PROCEDURE — ? TREATMENT REGIMEN

## 2023-08-16 PROCEDURE — ? DERMATOSCOPIC EVALUATION

## 2023-08-16 ASSESSMENT — LOCATION ZONE DERM
LOCATION ZONE: TRUNK
LOCATION ZONE: FACE

## 2023-08-16 ASSESSMENT — LOCATION DETAILED DESCRIPTION DERM
LOCATION DETAILED: RIGHT CENTRAL TEMPLE
LOCATION DETAILED: LEFT CENTRAL EYEBROW
LOCATION DETAILED: LEFT MEDIAL UPPER BACK
LOCATION DETAILED: RIGHT INFERIOR LATERAL FOREHEAD
LOCATION DETAILED: EPIGASTRIC SKIN
LOCATION DETAILED: LEFT INFERIOR UPPER BACK

## 2023-08-16 ASSESSMENT — LOCATION SIMPLE DESCRIPTION DERM
LOCATION SIMPLE: LEFT EYEBROW
LOCATION SIMPLE: RIGHT FOREHEAD
LOCATION SIMPLE: RIGHT TEMPLE
LOCATION SIMPLE: ABDOMEN
LOCATION SIMPLE: LEFT UPPER BACK

## 2023-08-16 NOTE — PROCEDURE: MIPS QUALITY
Quality 47: Advance Care Plan: Advance Care Planning discussed and documented; advance care plan or surrogate decision maker documented in the medical record.
Quality 111:Pneumonia Vaccination Status For Older Adults: Patient received any pneumococcal conjugate or polysaccharide vaccine on or after their 60th birthday and before the end of the measurement period
Quality 130: Documentation Of Current Medications In The Medical Record: Current Medications Documented
Detail Level: Detailed
Quality 110: Preventive Care And Screening: Influenza Immunization: Influenza Immunization Administered during Influenza season

## 2024-03-28 ENCOUNTER — APPOINTMENT (RX ONLY)
Dept: URBAN - METROPOLITAN AREA CLINIC 329 | Facility: CLINIC | Age: 39
Setting detail: DERMATOLOGY
End: 2024-03-28

## 2024-03-28 DIAGNOSIS — L81.4 OTHER MELANIN HYPERPIGMENTATION: ICD-10-CM

## 2024-03-28 DIAGNOSIS — L82.0 INFLAMED SEBORRHEIC KERATOSIS: ICD-10-CM

## 2024-03-28 DIAGNOSIS — L57.8 OTHER SKIN CHANGES DUE TO CHRONIC EXPOSURE TO NONIONIZING RADIATION: ICD-10-CM

## 2024-03-28 PROCEDURE — ? RECOMMENDATIONS

## 2024-03-28 PROCEDURE — ? SUNSCREEN RECOMMENDATIONS

## 2024-03-28 PROCEDURE — ? COUNSELING

## 2024-03-28 PROCEDURE — 99213 OFFICE O/P EST LOW 20 MIN: CPT

## 2024-03-28 PROCEDURE — ? FULL BODY SKIN EXAM - DECLINED

## 2024-03-28 ASSESSMENT — LOCATION DETAILED DESCRIPTION DERM
LOCATION DETAILED: RIGHT CENTRAL MALAR CHEEK
LOCATION DETAILED: LEFT MEDIAL SUPERIOR CHEST
LOCATION DETAILED: LEFT INFERIOR MEDIAL FOREHEAD
LOCATION DETAILED: SUPERIOR MID FOREHEAD

## 2024-03-28 ASSESSMENT — LOCATION SIMPLE DESCRIPTION DERM
LOCATION SIMPLE: LEFT FOREHEAD
LOCATION SIMPLE: CHEST
LOCATION SIMPLE: RIGHT CHEEK
LOCATION SIMPLE: SUPERIOR FOREHEAD

## 2024-03-28 ASSESSMENT — LOCATION ZONE DERM
LOCATION ZONE: FACE
LOCATION ZONE: TRUNK

## 2024-03-28 NOTE — PROCEDURE: RECOMMENDATIONS
Detail Level: Detailed
Render Risk Assessment In Note?: no
Recommendation Preamble: The following recommendations were made during the visit:
Recommendations (Free Text): Provider recommends patient observe the area and monitor until her return visit in August

## 2024-03-28 NOTE — HPI: EVALUATION OF SKIN LESION(S)
What Type Of Note Output Would You Prefer (Optional)?: Bullet Format
Hpi Title: Evaluation of Skin Lesions
Additional History: Pt reporting for two spots of concern. One is on her R cheek and the other is on her L breast. Both spots have been around for about a month, they’re both red, don’t itch, don’t bleed. She also some minor itching on her R arm. Pt denies knowledge of any other new or changing lesions. Pt consents to visiting medical student being present for exam.

## 2024-08-12 ENCOUNTER — APPOINTMENT (RX ONLY)
Dept: URBAN - METROPOLITAN AREA CLINIC 329 | Facility: CLINIC | Age: 39
Setting detail: DERMATOLOGY
End: 2024-08-12

## 2024-08-12 DIAGNOSIS — L60.8 OTHER NAIL DISORDERS: ICD-10-CM

## 2024-08-12 DIAGNOSIS — L82.1 OTHER SEBORRHEIC KERATOSIS: ICD-10-CM

## 2024-08-12 DIAGNOSIS — D22 MELANOCYTIC NEVI: ICD-10-CM

## 2024-08-12 DIAGNOSIS — D18.0 HEMANGIOMA: ICD-10-CM

## 2024-08-12 DIAGNOSIS — L57.8 OTHER SKIN CHANGES DUE TO CHRONIC EXPOSURE TO NONIONIZING RADIATION: ICD-10-CM

## 2024-08-12 DIAGNOSIS — L81.4 OTHER MELANIN HYPERPIGMENTATION: ICD-10-CM

## 2024-08-12 PROBLEM — D22.61 MELANOCYTIC NEVI OF RIGHT UPPER LIMB, INCLUDING SHOULDER: Status: ACTIVE | Noted: 2024-08-12

## 2024-08-12 PROBLEM — D22.71 MELANOCYTIC NEVI OF RIGHT LOWER LIMB, INCLUDING HIP: Status: ACTIVE | Noted: 2024-08-12

## 2024-08-12 PROBLEM — D18.01 HEMANGIOMA OF SKIN AND SUBCUTANEOUS TISSUE: Status: ACTIVE | Noted: 2024-08-12

## 2024-08-12 PROBLEM — D22.5 MELANOCYTIC NEVI OF TRUNK: Status: ACTIVE | Noted: 2024-08-12

## 2024-08-12 PROBLEM — D22.72 MELANOCYTIC NEVI OF LEFT LOWER LIMB, INCLUDING HIP: Status: ACTIVE | Noted: 2024-08-12

## 2024-08-12 PROCEDURE — 99213 OFFICE O/P EST LOW 20 MIN: CPT

## 2024-08-12 PROCEDURE — ? COUNSELING

## 2024-08-12 PROCEDURE — ? TREATMENT REGIMEN

## 2024-08-12 ASSESSMENT — LOCATION DETAILED DESCRIPTION DERM
LOCATION DETAILED: LEFT INFERIOR UPPER BACK
LOCATION DETAILED: RIGHT ANTERIOR DISTAL THIGH
LOCATION DETAILED: RIGHT SUPERIOR MEDIAL UPPER BACK
LOCATION DETAILED: PERIUNGUAL SKIN LEFT INDEX FINGER
LOCATION DETAILED: LEFT ANTERIOR DISTAL UPPER ARM
LOCATION DETAILED: LEFT DISTAL POSTERIOR THIGH
LOCATION DETAILED: EPIGASTRIC SKIN
LOCATION DETAILED: RIGHT CLAVICULAR SKIN
LOCATION DETAILED: RIGHT PROXIMAL DORSAL FOREARM
LOCATION DETAILED: MID-FRONTAL SCALP
LOCATION DETAILED: LEFT DISTAL CALF
LOCATION DETAILED: SUPERIOR THORACIC SPINE
LOCATION DETAILED: LEFT CENTRAL MALAR CHEEK
LOCATION DETAILED: LEFT PROXIMAL POSTERIOR THIGH
LOCATION DETAILED: RIGHT ANTERIOR DISTAL UPPER ARM
LOCATION DETAILED: RIGHT VENTRAL DISTAL FOREARM
LOCATION DETAILED: UPPER STERNUM

## 2024-08-12 ASSESSMENT — LOCATION SIMPLE DESCRIPTION DERM
LOCATION SIMPLE: LEFT POSTERIOR THIGH
LOCATION SIMPLE: LEFT UPPER ARM
LOCATION SIMPLE: RIGHT UPPER BACK
LOCATION SIMPLE: LEFT INDEX FINGER
LOCATION SIMPLE: LEFT CHEEK
LOCATION SIMPLE: RIGHT THIGH
LOCATION SIMPLE: CHEST
LOCATION SIMPLE: ANTERIOR SCALP
LOCATION SIMPLE: RIGHT UPPER ARM
LOCATION SIMPLE: RIGHT CLAVICULAR SKIN
LOCATION SIMPLE: UPPER BACK
LOCATION SIMPLE: LEFT CALF
LOCATION SIMPLE: ABDOMEN
LOCATION SIMPLE: LEFT UPPER BACK
LOCATION SIMPLE: RIGHT FOREARM

## 2024-08-12 ASSESSMENT — LOCATION ZONE DERM
LOCATION ZONE: ARM
LOCATION ZONE: SCALP
LOCATION ZONE: FACE
LOCATION ZONE: LEG
LOCATION ZONE: TRUNK
LOCATION ZONE: FINGER

## 2024-08-12 NOTE — HPI: EVALUATION OF SKIN LESION(S)
What Type Of Note Output Would You Prefer (Optional)?: Bullet Format
Hpi Title: Evaluation of Skin Lesions
Family Member: Father
Additional History: The patient reports some new spots on her face. She has a spot on her lower right arm that she believes has gotten larger.

## 2025-04-23 NOTE — LACTATION NOTE
This note was copied from a baby's chart. Lactation visit. 2nd time mom,  first x 6 months. This baby not yet 24 hours old, latching well so far per mom. Baby due to feed now. Assisted with waking infant and reviewed waking measures with parents. Baby had 2 large stool diapers prior to feeding. Assisted on right breast, football hold. Mom has good technique. Larger everted nipples, dimple in center of each. Baby took a few tries but then able to latch well. Stays on well. Deeply latched. Did well on right breast x 15 minutes. Tried left but too sleepy. Baby swaddled in cradle post feed. Doing well. Reviewed feeding expectations. Feed on demand. Attempt every 3 hours at least. Wake baby as needed. Questions answered. Bill For Surgical Tray: no Billing Type: Third-Party Bill Performing Laboratory: [id number] Expected Date Of Service: 04/23/2025

## 2025-08-13 ENCOUNTER — APPOINTMENT (OUTPATIENT)
Dept: URBAN - METROPOLITAN AREA CLINIC 329 | Facility: CLINIC | Age: 40
Setting detail: DERMATOLOGY
End: 2025-08-13

## 2025-08-13 DIAGNOSIS — L81.4 OTHER MELANIN HYPERPIGMENTATION: ICD-10-CM

## 2025-08-13 DIAGNOSIS — D22 MELANOCYTIC NEVI: ICD-10-CM

## 2025-08-13 DIAGNOSIS — L60.3 NAIL DYSTROPHY: ICD-10-CM | Status: INADEQUATELY CONTROLLED

## 2025-08-13 DIAGNOSIS — L82.1 OTHER SEBORRHEIC KERATOSIS: ICD-10-CM

## 2025-08-13 DIAGNOSIS — Z12.83 ENCOUNTER FOR SCREENING FOR MALIGNANT NEOPLASM OF SKIN: ICD-10-CM

## 2025-08-13 DIAGNOSIS — Z71.89 OTHER SPECIFIED COUNSELING: ICD-10-CM

## 2025-08-13 DIAGNOSIS — D18.0 HEMANGIOMA: ICD-10-CM

## 2025-08-13 DIAGNOSIS — L57.8 OTHER SKIN CHANGES DUE TO CHRONIC EXPOSURE TO NONIONIZING RADIATION: ICD-10-CM

## 2025-08-13 PROBLEM — D18.01 HEMANGIOMA OF SKIN AND SUBCUTANEOUS TISSUE: Status: ACTIVE | Noted: 2025-08-13

## 2025-08-13 PROBLEM — D22.5 MELANOCYTIC NEVI OF TRUNK: Status: ACTIVE | Noted: 2025-08-13

## 2025-08-13 PROBLEM — D22.72 MELANOCYTIC NEVI OF LEFT LOWER LIMB, INCLUDING HIP: Status: ACTIVE | Noted: 2025-08-13

## 2025-08-13 PROBLEM — L60.9 NAIL DISORDER, UNSPECIFIED: Status: ACTIVE | Noted: 2025-08-13

## 2025-08-13 PROBLEM — D22.71 MELANOCYTIC NEVI OF RIGHT LOWER LIMB, INCLUDING HIP: Status: ACTIVE | Noted: 2025-08-13

## 2025-08-13 PROBLEM — D22.61 MELANOCYTIC NEVI OF RIGHT UPPER LIMB, INCLUDING SHOULDER: Status: ACTIVE | Noted: 2025-08-13

## 2025-08-13 PROCEDURE — ? TREATMENT REGIMEN

## 2025-08-13 PROCEDURE — ? FULL BODY SKIN EXAM

## 2025-08-13 PROCEDURE — ? COUNSELING

## 2025-08-13 PROCEDURE — ? SUNSCREEN RECOMMENDATIONS

## 2025-08-13 PROCEDURE — ? ADDITIONAL NOTES

## 2025-08-13 ASSESSMENT — LOCATION DETAILED DESCRIPTION DERM
LOCATION DETAILED: LEFT CENTRAL MALAR CHEEK
LOCATION DETAILED: RIGHT SUPERIOR MEDIAL UPPER BACK
LOCATION DETAILED: RIGHT PROXIMAL DORSAL FOREARM
LOCATION DETAILED: LEFT INFERIOR UPPER BACK
LOCATION DETAILED: LEFT DISTAL CALF
LOCATION DETAILED: RIGHT CLAVICULAR SKIN
LOCATION DETAILED: RIGHT ANTERIOR DISTAL UPPER ARM
LOCATION DETAILED: EPIGASTRIC SKIN
LOCATION DETAILED: UPPER STERNUM
LOCATION DETAILED: RIGHT VENTRAL DISTAL FOREARM
LOCATION DETAILED: LEFT SMALL FINGERNAIL
LOCATION DETAILED: RIGHT ULNAR DORSAL HAND
LOCATION DETAILED: LEFT PROXIMAL POSTERIOR THIGH
LOCATION DETAILED: SUPERIOR THORACIC SPINE
LOCATION DETAILED: LEFT ANTERIOR DISTAL UPPER ARM
LOCATION DETAILED: MID-FRONTAL SCALP
LOCATION DETAILED: LEFT DISTAL POSTERIOR THIGH
LOCATION DETAILED: RIGHT ANTERIOR DISTAL THIGH

## 2025-08-13 ASSESSMENT — NAIL INVOLVEMENT PERCENT: % OF NAIL(S) INVOLVED: 1

## 2025-08-13 ASSESSMENT — LOCATION ZONE DERM
LOCATION ZONE: FINGERNAIL
LOCATION ZONE: LEG
LOCATION ZONE: ARM
LOCATION ZONE: HAND
LOCATION ZONE: FACE
LOCATION ZONE: SCALP
LOCATION ZONE: TRUNK

## 2025-08-13 ASSESSMENT — LOCATION SIMPLE DESCRIPTION DERM
LOCATION SIMPLE: LEFT CALF
LOCATION SIMPLE: ABDOMEN
LOCATION SIMPLE: RIGHT THIGH
LOCATION SIMPLE: LEFT UPPER ARM
LOCATION SIMPLE: CHEST
LOCATION SIMPLE: RIGHT HAND
LOCATION SIMPLE: RIGHT CLAVICULAR SKIN
LOCATION SIMPLE: RIGHT UPPER BACK
LOCATION SIMPLE: LEFT SMALL FINGERNAIL
LOCATION SIMPLE: UPPER BACK
LOCATION SIMPLE: ANTERIOR SCALP
LOCATION SIMPLE: LEFT UPPER BACK
LOCATION SIMPLE: RIGHT FOREARM
LOCATION SIMPLE: RIGHT UPPER ARM
LOCATION SIMPLE: LEFT POSTERIOR THIGH
LOCATION SIMPLE: LEFT CHEEK

## (undated) DEVICE — SOLUTION IV 1000ML 0.9% SOD CHL

## (undated) DEVICE — Z DISCONTINUED USE 2659133 CANNULA VAC DIA8MM CRV SEMI RIG W/ ROUNDED TIP TAPR END

## (undated) DEVICE — PAD MATERNITY 11IN W/TAILS -- STRL

## (undated) DEVICE — REM POLYHESIVE ADULT PATIENT RETURN ELECTRODE: Brand: VALLEYLAB

## (undated) DEVICE — DRAPE,UNDERBUTTOCKS,PCH,STERILE: Brand: MEDLINE

## (undated) DEVICE — U.V.A.C. SWIVEL HANDLE W/TUBING, 6': Brand: CONVERTORS

## (undated) DEVICE — CYSTO: Brand: MEDLINE INDUSTRIES, INC.

## (undated) DEVICE — GOWN,REINF,POLY,ECL,PP SLV,XL: Brand: MEDLINE

## (undated) DEVICE — TRAY PREP DRY W/ PREM GLV 2 APPL 6 SPNG 2 UNDPD 1 OVERWRAP

## (undated) DEVICE — CARDINAL HEALTH FLEXIBLE LIGHT HANDLE COVER: Brand: CARDINAL HEALTH